# Patient Record
Sex: FEMALE | Race: WHITE | NOT HISPANIC OR LATINO | Employment: UNEMPLOYED | URBAN - METROPOLITAN AREA
[De-identification: names, ages, dates, MRNs, and addresses within clinical notes are randomized per-mention and may not be internally consistent; named-entity substitution may affect disease eponyms.]

---

## 2018-10-08 ENCOUNTER — TRANSCRIBE ORDERS (OUTPATIENT)
Dept: WOMENS IMAGING | Facility: HOSPITAL | Age: 28
End: 2018-10-08

## 2018-10-08 DIAGNOSIS — Z36.89 SCREENING, ANTENATAL, FOR FETAL ANATOMIC SURVEY: Primary | ICD-10-CM

## 2018-10-24 ENCOUNTER — HOSPITAL ENCOUNTER (OUTPATIENT)
Dept: WOMENS IMAGING | Facility: HOSPITAL | Age: 28
Discharge: HOME | End: 2018-10-24
Attending: OBSTETRICS & GYNECOLOGY | Admitting: OBSTETRICS & GYNECOLOGY

## 2018-10-24 ENCOUNTER — OFFICE VISIT (OUTPATIENT)
Dept: OBSTETRICS AND GYNECOLOGY | Facility: HOSPITAL | Age: 28
End: 2018-10-24

## 2018-10-24 VITALS
SYSTOLIC BLOOD PRESSURE: 126 MMHG | DIASTOLIC BLOOD PRESSURE: 68 MMHG | BODY MASS INDEX: 21.26 KG/M2 | WEIGHT: 127.6 LBS | HEIGHT: 65 IN

## 2018-10-24 DIAGNOSIS — Z36.89 SCREENING, ANTENATAL, FOR FETAL ANATOMIC SURVEY: ICD-10-CM

## 2018-10-24 DIAGNOSIS — Z98.891 PREVIOUS CESAREAN SECTION: Primary | ICD-10-CM

## 2018-10-24 DIAGNOSIS — O09.212 PREVIOUS PRETERM DELIVERY IN SECOND TRIMESTER, ANTEPARTUM: ICD-10-CM

## 2018-10-24 PROCEDURE — 99241 PR OFFICE CONSULTATION NEW/ESTAB PATIENT 15 MIN: CPT | Performed by: OBSTETRICS & GYNECOLOGY

## 2018-10-24 PROCEDURE — 76811 OB US DETAILED SNGL FETUS: CPT

## 2018-10-24 PROCEDURE — 76811 OB US DETAILED SNGL FETUS: CPT | Performed by: OBSTETRICS & GYNECOLOGY

## 2018-10-24 RX ORDER — ASCORBIC ACID 500 MG
500 TABLET ORAL DAILY
COMMUNITY

## 2018-10-24 NOTE — PROGRESS NOTES
Documentation of the ultasound findings, images, and interpretations as well as consultation note will be available in the patient's Viewpoint report located in the Chart Review Imaging tab in [employer].

## 2018-11-19 ENCOUNTER — TRANSCRIBE ORDERS (OUTPATIENT)
Dept: OBSTETRICS AND GYNECOLOGY | Facility: HOSPITAL | Age: 28
End: 2018-11-19

## 2018-11-19 DIAGNOSIS — O09.893 HX OF PRETERM DELIVERY, CURRENTLY PREGNANT, THIRD TRIMESTER: Primary | ICD-10-CM

## 2018-11-19 DIAGNOSIS — O26.879 SHORT CERVICAL LENGTH DURING PREGNANCY, UNSPECIFIED TRIMESTER: ICD-10-CM

## 2018-11-29 ENCOUNTER — OFFICE VISIT (OUTPATIENT)
Dept: OBSTETRICS AND GYNECOLOGY | Facility: HOSPITAL | Age: 28
End: 2018-11-29

## 2018-11-29 ENCOUNTER — HOSPITAL ENCOUNTER (OUTPATIENT)
Dept: WOMENS IMAGING | Facility: HOSPITAL | Age: 28
Discharge: HOME | End: 2018-11-29
Attending: OBSTETRICS & GYNECOLOGY | Admitting: OBSTETRICS & GYNECOLOGY

## 2018-11-29 VITALS — DIASTOLIC BLOOD PRESSURE: 69 MMHG | SYSTOLIC BLOOD PRESSURE: 112 MMHG | BODY MASS INDEX: 22.7 KG/M2 | WEIGHT: 136.4 LBS

## 2018-11-29 DIAGNOSIS — O09.212 PREVIOUS PRETERM DELIVERY IN SECOND TRIMESTER, ANTEPARTUM: ICD-10-CM

## 2018-11-29 DIAGNOSIS — O26.879 SHORT CERVICAL LENGTH DURING PREGNANCY, UNSPECIFIED TRIMESTER: ICD-10-CM

## 2018-11-29 DIAGNOSIS — O09.893 HX OF PRETERM DELIVERY, CURRENTLY PREGNANT, THIRD TRIMESTER: ICD-10-CM

## 2018-11-29 DIAGNOSIS — Z98.891 PREVIOUS CESAREAN SECTION: Primary | ICD-10-CM

## 2018-11-29 PROCEDURE — 76816 OB US FOLLOW-UP PER FETUS: CPT

## 2018-11-29 PROCEDURE — 76816 OB US FOLLOW-UP PER FETUS: CPT | Performed by: OBSTETRICS & GYNECOLOGY

## 2018-11-29 NOTE — PROGRESS NOTES
Documentation of the ultasound findings, images, and interpretations with be available in the patient's Viewpoint report located in the Chart Review Imaging tab in [employer].     Elaine Durant (NP; RN), NP in Family Health  [address] [address]  Phone: [phone]  Fax: [phone]

## 2018-11-29 NOTE — PROGRESS NOTES
Reports abdominal tightness and scant brown discharge yesterday; none since. Denies other problems. To see Dr. Juárez today.

## 2019-01-02 ENCOUNTER — HOSPITAL ENCOUNTER (INPATIENT)
Facility: HOSPITAL | Age: 29
LOS: 4 days | Discharge: HOME | End: 2019-01-06
Attending: OBSTETRICS & GYNECOLOGY | Admitting: OBSTETRICS & GYNECOLOGY

## 2019-01-02 PROBLEM — O60.00 PRETERM LABOR: Status: ACTIVE | Noted: 2019-01-02

## 2019-01-02 LAB
ABO GROUP BLD: NORMAL
BLD GP AB SCN SERPL QL: NEGATIVE
DEPRECATED RDW RBC AUTO: 48.5 FL (ref 37–54)
ERYTHROCYTE [DISTWIDTH] IN BLOOD BY AUTOMATED COUNT: 13.4 % (ref 11.3–14.5)
HCT VFR BLD AUTO: 35.4 % (ref 34.5–44)
HGB BLD-MCNC: 11.6 G/DL (ref 11.5–15.5)
MCH RBC QN AUTO: 32.5 PG (ref 27–31)
MCHC RBC AUTO-ENTMCNC: 32.8 G/DL (ref 32–36)
MCV RBC AUTO: 99.2 FL (ref 80–99)
PLATELET # BLD AUTO: 226 10*3/MM3 (ref 150–450)
PMV BLD AUTO: 9.5 FL (ref 6–12)
RBC # BLD AUTO: 3.57 10*6/MM3 (ref 3.89–5.14)
RH BLD: POSITIVE
T&S EXPIRATION DATE: NORMAL
WBC NRBC COR # BLD: 11.62 10*3/MM3 (ref 3.5–10.8)

## 2019-01-02 PROCEDURE — 25010000002 BETAMETHASONE ACET & SOD PHOS PER 4 MG: Performed by: OBSTETRICS & GYNECOLOGY

## 2019-01-02 PROCEDURE — 86900 BLOOD TYPING SEROLOGIC ABO: CPT | Performed by: OBSTETRICS & GYNECOLOGY

## 2019-01-02 PROCEDURE — 86850 RBC ANTIBODY SCREEN: CPT | Performed by: OBSTETRICS & GYNECOLOGY

## 2019-01-02 PROCEDURE — 85027 COMPLETE CBC AUTOMATED: CPT | Performed by: OBSTETRICS & GYNECOLOGY

## 2019-01-02 PROCEDURE — 59025 FETAL NON-STRESS TEST: CPT

## 2019-01-02 PROCEDURE — 25010000002 MAGNESIUM SULFATE-LACT RINGERS 40 GM/580ML SOLUTION: Performed by: OBSTETRICS & GYNECOLOGY

## 2019-01-02 PROCEDURE — 25010000002 PROMETHAZINE PER 50 MG: Performed by: OBSTETRICS & GYNECOLOGY

## 2019-01-02 PROCEDURE — 86901 BLOOD TYPING SEROLOGIC RH(D): CPT | Performed by: OBSTETRICS & GYNECOLOGY

## 2019-01-02 RX ORDER — PROMETHAZINE HYDROCHLORIDE 25 MG/ML
12.5 INJECTION, SOLUTION INTRAMUSCULAR; INTRAVENOUS EVERY 6 HOURS PRN
Status: DISCONTINUED | OUTPATIENT
Start: 2019-01-02 | End: 2019-01-06 | Stop reason: HOSPADM

## 2019-01-02 RX ORDER — MAGNESIUM SULF/RINGERS LACTATE 40 G/500ML
2 PLASTIC BAG, INJECTION (ML) INTRAVENOUS CONTINUOUS
Status: DISCONTINUED | OUTPATIENT
Start: 2019-01-02 | End: 2019-01-04

## 2019-01-02 RX ORDER — PROMETHAZINE HYDROCHLORIDE 12.5 MG/1
12.5 TABLET ORAL EVERY 6 HOURS PRN
Status: DISCONTINUED | OUTPATIENT
Start: 2019-01-02 | End: 2019-01-06 | Stop reason: HOSPADM

## 2019-01-02 RX ORDER — PRENATAL VIT/IRON FUM/FOLIC AC 27MG-0.8MG
1 TABLET ORAL NIGHTLY
Status: DISCONTINUED | OUTPATIENT
Start: 2019-01-02 | End: 2019-01-06 | Stop reason: HOSPADM

## 2019-01-02 RX ORDER — ACETAMINOPHEN 325 MG/1
650 TABLET ORAL EVERY 4 HOURS PRN
Status: DISCONTINUED | OUTPATIENT
Start: 2019-01-02 | End: 2019-01-06 | Stop reason: HOSPADM

## 2019-01-02 RX ORDER — PRENATAL VIT/IRON FUM/FOLIC AC 27MG-0.8MG
1 TABLET ORAL DAILY
Status: DISCONTINUED | OUTPATIENT
Start: 2019-01-02 | End: 2019-01-02

## 2019-01-02 RX ORDER — BETAMETHASONE SODIUM PHOSPHATE AND BETAMETHASONE ACETATE 3; 3 MG/ML; MG/ML
12 INJECTION, SUSPENSION INTRA-ARTICULAR; INTRALESIONAL; INTRAMUSCULAR; SOFT TISSUE EVERY 24 HOURS
Status: COMPLETED | OUTPATIENT
Start: 2019-01-02 | End: 2019-01-03

## 2019-01-02 RX ORDER — SODIUM CHLORIDE 0.9 % (FLUSH) 0.9 %
3 SYRINGE (ML) INJECTION EVERY 12 HOURS SCHEDULED
Status: DISCONTINUED | OUTPATIENT
Start: 2019-01-02 | End: 2019-01-06 | Stop reason: HOSPADM

## 2019-01-02 RX ORDER — LIDOCAINE HYDROCHLORIDE 10 MG/ML
5 INJECTION, SOLUTION EPIDURAL; INFILTRATION; INTRACAUDAL; PERINEURAL AS NEEDED
Status: DISCONTINUED | OUTPATIENT
Start: 2019-01-02 | End: 2019-01-06 | Stop reason: HOSPADM

## 2019-01-02 RX ORDER — SODIUM CHLORIDE 0.9 % (FLUSH) 0.9 %
3-10 SYRINGE (ML) INJECTION AS NEEDED
Status: DISCONTINUED | OUTPATIENT
Start: 2019-01-02 | End: 2019-01-06 | Stop reason: HOSPADM

## 2019-01-02 RX ORDER — SODIUM CHLORIDE, SODIUM LACTATE, POTASSIUM CHLORIDE, CALCIUM CHLORIDE 600; 310; 30; 20 MG/100ML; MG/100ML; MG/100ML; MG/100ML
96 INJECTION, SOLUTION INTRAVENOUS CONTINUOUS
Status: DISCONTINUED | OUTPATIENT
Start: 2019-01-02 | End: 2019-01-06 | Stop reason: HOSPADM

## 2019-01-02 RX ORDER — BISACODYL 5 MG/1
5 TABLET, DELAYED RELEASE ORAL DAILY PRN
Status: DISCONTINUED | OUTPATIENT
Start: 2019-01-02 | End: 2019-01-06 | Stop reason: HOSPADM

## 2019-01-02 RX ADMIN — BETAMETHASONE ACETATE AND BETAMETHASONE SODIUM PHOSPHATE 12 MG: 3; 3 INJECTION, SUSPENSION INTRA-ARTICULAR; INTRALESIONAL; INTRAMUSCULAR; SOFT TISSUE at 13:38

## 2019-01-02 RX ADMIN — SODIUM CHLORIDE, POTASSIUM CHLORIDE, SODIUM LACTATE AND CALCIUM CHLORIDE 96 ML/HR: 600; 310; 30; 20 INJECTION, SOLUTION INTRAVENOUS at 20:04

## 2019-01-02 RX ADMIN — Medication 2 G/HR: at 14:03

## 2019-01-02 RX ADMIN — PROMETHAZINE HYDROCHLORIDE 12.5 MG: 25 INJECTION INTRAMUSCULAR; INTRAVENOUS at 13:37

## 2019-01-02 RX ADMIN — SODIUM CHLORIDE, POTASSIUM CHLORIDE, SODIUM LACTATE AND CALCIUM CHLORIDE 96 ML/HR: 600; 310; 30; 20 INJECTION, SOLUTION INTRAVENOUS at 13:36

## 2019-01-02 NOTE — PLAN OF CARE
Problem: Patient Care Overview  Goal: Plan of Care Review  Outcome: Ongoing (interventions implemented as appropriate)   19 1700   Coping/Psychosocial   Plan of Care Reviewed With patient     Goal: Individualization and Mutuality  Outcome: Ongoing (interventions implemented as appropriate)    Goal: Discharge Needs Assessment  Outcome: Ongoing (interventions implemented as appropriate)    Goal: Interprofessional Rounds/Family Conf  Outcome: Ongoing (interventions implemented as appropriate)      Problem:  Labor (Adult,Obstetrics,Pediatric)  Goal: Signs and Symptoms of Listed Potential Problems Will be Absent, Minimized or Managed ( Labor)  Outcome: Ongoing (interventions implemented as appropriate)   19 1802   Goal/Outcome Evaluation   Problems Assessed ( Labor) all   Problems Present ( Labor) none

## 2019-01-02 NOTE — H&P
History and Physical:    Subjective     No chief complaint on file.      Luz Elena Chakraborty is a 28 y.o. year old  with an Estimated Date of Delivery: 3/19/19 currently at 29w1d presenting with Increasing contractions and pelvic discomfort for past two days. Cvx in office noted to be dilated to 1/50/-2 and soft. Patient has a history of PPROM and PTL in past and declined Iatan. .    Prenatal care has been with Ave Juárez MD.  It has been significant for  labor and previous C/S - (classical at 27 weeks).        Review of Systems  Pertinent items are noted in HPI.     Past Medical History:   Diagnosis Date   • Abnormal Pap smear of cervix    • H/O  section    • HPV in female      Past Surgical History:   Procedure Laterality Date   •  SECTION  02/15/2016   • SHOULDER SURGERY      Right shoulder, posterior capsule release     No family history on file.  Social History     Tobacco Use   • Smoking status: Never Smoker   • Smokeless tobacco: Never Used   Substance Use Topics   • Alcohol use: No     Frequency: Never   • Drug use: No     Medications Prior to Admission   Medication Sig Dispense Refill Last Dose   • ELDERBERRY PO Take 2-3 teaspoon(s) by mouth Daily.   Taking   • Ferrous Sulfate (SLOW FE PO) Take 45 mg by mouth Daily.   Taking   • Prenatal Vit-Fe Fumarate-FA (PRENATAL VITAMIN PO) Take 1 tablet by mouth Daily.   Taking   • vitamin C (ASCORBIC ACID) 500 MG tablet Take 500 mg by mouth Daily.   Taking   • VITAMIN D-VITAMIN K PO Take 1 tablet by mouth Daily.   Taking     Allergies:  Patient has no known allergies.  OB History    Para Term  AB Living   4 2 0 2 1 2   SAB TAB Ectopic Molar Multiple Live Births   1 0 0 0 0 2      # Outcome Date GA Lbr Duane/2nd Weight Sex Delivery Anes PTL Lv   4 Current            3 SAB 2017 5w0d             Birth Comments: no D&C   2  02/15/16 27w2d  1247 g (2 lb 12 oz) F CS-Unspec  Y GLENN      Complications:   premature rupture of membranes      Birth Comments: PPROM @ 24 weeks   1  14 36w4d  2722 g (6 lb) F Vag-Spont  N GLENN      Birth Comments: Spontaneous labor            Objective     LMP 2018     Physical Exam    General:  No acute distress           Abdomen: Gravid, nontender       FHT's: reassuring    Cervix: 1/50/-2   [place]: Contraction are irregular     Lab Review   External Prenatal Results     Pregnancy Outside Results - Transcribed From Office Records - See Scanned Records For Details     Test Value Date Time    Hgb 12.4 g/dL 10/08/15 1606    Hct 36.5 % 10/08/15 1606    ABO       Rh       Antibody Screen       Glucose Fasting GTT       Glucose Tolerance Test 1 hour       Glucose Tolerance Test 3 hour       Gonorrhea (discrete)       Chlamydia (discrete)       RPR       VDRL       Syphilis Antibody       Rubella       HBsAg NonReactive  10/08/15 1606    Herpes Simplex Virus PCR       Herpes Simplex VIrus Culture       HIV       Hep C RNA Quant PCR       Hep C Antibody       AFP       Group B Strep       GBS Susceptibility to Clindamycin       GBS Susceptibility to Erythromycin       Fetal Fibronectin       Genetic Testing, Maternal Blood             Drug Screening     Test Value Date Time    Urine Drug Screen       Amphetamine Screen Negative ng/mL 10/08/15 1606    Barbiturate Screen Negative ng/mL 10/08/15 1606    Benzodiazepine Screen Negative ng/mL 10/08/15 1606    Methadone Screen Negative ng/mL 10/08/15 1606    Phencyclidine Screen Negative ng/mL 10/08/15 1606    Opiates Screen       THC Screen       Cocaine Screen       Propoxyphene Screen Negative ng/mL 10/08/15 1606    Buprenorphine Screen Negative ng/mL 10/08/15 1606    Methamphetamine Screen       Oxycodone Screen Negative ng/mL 10/08/15 1606    Tricyclic Antidepressants Screen                       Assessment/Plan     ASSESSMENT  1. IUP at 29w1d  2.  labor- with h/o c/s at 27 weeks.     PLAN  1. Admit to labor and  delivery   2.  Mag through sterpoid time. PDC to consult  3. Patient for repeat c/s if labors         Ave Juárez MD  1/2/2019@

## 2019-01-03 ENCOUNTER — APPOINTMENT (OUTPATIENT)
Dept: WOMENS IMAGING | Facility: HOSPITAL | Age: 29
End: 2019-01-03

## 2019-01-03 PROCEDURE — 25010000002 MAGNESIUM SULFATE-LACT RINGERS 40 GM/580ML SOLUTION: Performed by: OBSTETRICS & GYNECOLOGY

## 2019-01-03 PROCEDURE — 59025 FETAL NON-STRESS TEST: CPT

## 2019-01-03 PROCEDURE — 76816 OB US FOLLOW-UP PER FETUS: CPT | Performed by: OBSTETRICS & GYNECOLOGY

## 2019-01-03 PROCEDURE — 25010000002 BETAMETHASONE ACET & SOD PHOS PER 4 MG: Performed by: OBSTETRICS & GYNECOLOGY

## 2019-01-03 PROCEDURE — 76816 OB US FOLLOW-UP PER FETUS: CPT

## 2019-01-03 RX ADMIN — SODIUM CHLORIDE, POTASSIUM CHLORIDE, SODIUM LACTATE AND CALCIUM CHLORIDE 96 ML/HR: 600; 310; 30; 20 INJECTION, SOLUTION INTRAVENOUS at 06:34

## 2019-01-03 RX ADMIN — Medication 2 G/HR: at 07:21

## 2019-01-03 RX ADMIN — BETAMETHASONE ACETATE AND BETAMETHASONE SODIUM PHOSPHATE 12 MG: 3; 3 INJECTION, SUSPENSION INTRA-ARTICULAR; INTRALESIONAL; INTRAMUSCULAR; SOFT TISSUE at 14:10

## 2019-01-03 NOTE — PAYOR COMM NOTE
"Tiago Bauer (28 y.o. Female)   [place] Medicaid ID#[id number]  Inpatient Medical Admission 1/2/19    From: Connie Hare  #[phone]  Fax#[phone]      Date of Birth Social Security Number Address Home Phone MRN    1990  [address]   [place] [address] [phone] [id number]    [employer] Mar[employer] Status          Buddhist        Admission Date Admission Type Admitting Provider Attending Provider Department, Room/Bed    1/2/19 Elective Ave Juárez MD Ashmun, Julie N, MD [facility] LABOR DELIVERY, N303/1    Discharge Date Discharge Disposition Discharge Destination                       Attending Provider:  Ave Juárez MD    Allergies:  No Known Allergies    Isolation:  None   Infection:  None   Code Status:  CPR    Ht:  165.1 cm (65\")   Wt:  65.8 kg (145 lb)    Admission Cmt:  None   Principal Problem:  None                Active Insurance as of 1/2/2019     Primary Coverage     Payor Plan Insurance Group Employer/Plan Group    [facility] MEDICAID [facility] MEDICAID [id number]     Payor Plan Address Payor Plan Phone Number Payor Plan Fax Number Effective Dates    [address] [address] [phone]  9/1/2018 - None Entered    [facility] [id number]       Subscriber Name Subscriber Birth Date Member ID       TIAGO BAUER 1990 [id number]                 Emergency Contacts      (Rel.) Home Phone Work Phone Mobile Phone    Gustavo Bauer (Spouse) [phone] -- --            Insurance Information                [facility] MEDICAID/ANTHEM MEDICAID Phone: [phone]    Subscriber: Tiago Bauer Subscriber#: [id number]    Group#: [id number] Precert#:           Treatment Team     Provider Relationship Specialty Contact    Ave Juárez MD Attending, Surgeon Obstetrics and Gynecology  [phone]    Ambreen Mackay, RD Dietitian Nutrition  [phone]    Araceli Hodgson, RN Registered Nurse Obstetrics            Problem List           Codes " Noted - Resolved       Hospital     labor ICD-10-CM: O60.00  ICD-9-CM: 644.00 2019 - Present       Non-Hospital    Previous  section ICD-10-CM: Z98.891  ICD-9-CM: V45.89 10/24/2018 - Present    Previous  delivery in second trimester, antepartum ICD-10-CM: O09.212  ICD-9-CM: V23.41 10/24/2018 - Present             History & Physical      H&P filed by [employer], [place] at 2018  2:18 PM     Scan on 2019: < 39 WEEKS FORM  TC 2018 (below)            Electronically signed by Interface, Scans Incoming at 2018  2:18 PM     Ave Juárez MD at 2019  1:04 PM          History and Physical:    Subjective     No chief complaint on file.      Luz Elena Chakraborty is a 28 y.o. year old  with an Estimated Date of Delivery: 3/19/19 currently at 29w1d presenting with Increasing contractions and pelvic discomfort for past two days. Cvx in office noted to be dilated to 1/50/-2 and soft. Patient has a history of PPROM and PTL in past and declined Tatiana. .    Prenatal care has been with Ave Juárez MD.  It has been significant for  labor and previous C/S - (classical at 27 weeks).        Review of Systems  Pertinent items are noted in HPI.     Past Medical History:   Diagnosis Date   • Abnormal Pap smear of cervix    • H/O  section    • HPV in female      Past Surgical History:   Procedure Laterality Date   •  SECTION  02/15/2016   • SHOULDER SURGERY  2010    Right shoulder, posterior capsule release     No family history on file.  Social History     Tobacco Use   • Smoking status: Never Smoker   • Smokeless tobacco: Never Used   Substance Use Topics   • Alcohol use: No     Frequency: Never   • Drug use: No     Medications Prior to Admission   Medication Sig Dispense Refill Last Dose   • ELDERBERRY PO Take 2-3 teaspoon(s) by mouth Daily.   Taking   • Ferrous Sulfate (SLOW FE PO) Take 45 mg by mouth Daily.   Taking   • Prenatal Vit-Fe Fumarate-FA  (PRENATAL VITAMIN PO) Take 1 tablet by mouth Daily.   Taking   • vitamin C (ASCORBIC ACID) 500 MG tablet Take 500 mg by mouth Daily.   Taking   • VITAMIN D-VITAMIN K PO Take 1 tablet by mouth Daily.   Taking     Allergies:  Patient has no known allergies.  OB History    Para Term  AB Living   4 2 0 2 1 2   SAB TAB Ectopic Molar Multiple Live Births   1 0 0 0 0 2      # Outcome Date GA Lbr Duane/2nd Weight Sex Delivery Anes PTL Lv   4 Current            3 SAB 2017 5w0d             Birth Comments: no D&C   2  02/15/16 27w2d  1247 g (2 lb 12 oz) F CS-Unspec  Y GLENN      Complications:  premature rupture of membranes      Birth Comments: PPROM @ 24 weeks   1  14 36w4d  2722 g (6 lb) F Vag-Spont  N GLENN      Birth Comments: Spontaneous labor            Objective     LMP 2018     Physical Exam    General:  No acute distress           Abdomen: Gravid, nontender       FHT's: reassuring    Cervix: 1/50/-2   [place]: Contraction are irregular     Lab Review   External Prenatal Results     Pregnancy Outside Results - Transcribed From Office Records - See Scanned Records For Details     Test Value Date Time    Hgb 12.4 g/dL 10/08/15 1606    Hct 36.5 % 10/08/15 1606    ABO       Rh       Antibody Screen       Glucose Fasting GTT       Glucose Tolerance Test 1 hour       Glucose Tolerance Test 3 hour       Gonorrhea (discrete)       Chlamydia (discrete)       RPR       VDRL       Syphilis Antibody       Rubella       HBsAg NonReactive  10/08/15 1606    Herpes Simplex Virus PCR       Herpes Simplex VIrus Culture       HIV       Hep C RNA Quant PCR       Hep C Antibody       AFP       Group B Strep       GBS Susceptibility to Clindamycin       GBS Susceptibility to Erythromycin       Fetal Fibronectin       Genetic Testing, Maternal Blood             Drug Screening     Test Value Date Time    Urine Drug Screen       Amphetamine Screen Negative ng/mL 10/08/15 1606    Barbiturate Screen  Negative ng/mL 10/08/15 1606    Benzodiazepine Screen Negative ng/mL 10/08/15 1606    Methadone Screen Negative ng/mL 10/08/15 1606    Phencyclidine Screen Negative ng/mL 10/08/15 1606    Opiates Screen       THC Screen       Cocaine Screen       Propoxyphene Screen Negative ng/mL 10/08/15 1606    Buprenorphine Screen Negative ng/mL 10/08/15 1606    Methamphetamine Screen       Oxycodone Screen Negative ng/mL 10/08/15 1606    Tricyclic Antidepressants Screen                       Assessment/Plan     ASSESSMENT  1. IUP at 29w1d  2.  labor- with h/o c/s at 27 weeks.     PLAN  1. Admit to labor and delivery   2.  Mag through sterpoid time. PDC to consult  3. Patient for repeat c/s if labors         Ave Juárez MD  2019@    Electronically signed by Ave Juárez MD at 2019  1:22 PM       ICU Vital Signs     Row Name 19 0722 19 0700 19 0657 19 0600 19 0557       Vitals    Temp  97.8 °F (36.6 °C)  --  --  --  --    Temp src  Oral  --  --  --  --    Pulse  --  --  95  --  94    Resp  --  18  --  18  --    Resp Rate Source  --  Visual  --  Visual  --    BP  --  --  97/54  --  94/51    Row Name 19 0500 19 0457 19 0400 19 0356 19 0300       Vitals    Pulse  --  89  --  89  --    Resp  18  --  18  --  18    Resp Rate Source  Visual  --  Visual  --  Visual    BP  --  94/55  --  91/53  --    Row Name 19 0256 19 0200 19 0156 19 0100 19 0056       Vitals    Pulse  95  --  98  --  104    Resp  --  18  --  18  --    Resp Rate Source  --  Visual  --  Visual  --    BP  91/52  --  99/57  --  92/54    Row Name 19 0000 19 2356 01/0219       Vitals    Pulse  --  93  --  --  97    Resp  18  --  --  18  --    Resp Rate Source  Visual  --  --  Visual  --    BP  --    --  --         Patient Observation    Observations  --  --  up to the bathroom with RN assist, gait steady,l  "denies needs at this time   --  --    Row Name 01/02/19 2200 01/02/19 2156 01/02/19 2100 01/02/19 2057 01/02/19 2005       Vitals    Pulse  --  104  --  108  --    Resp  18  --  18  --  --    Resp Rate Source  Visual  --  Visual  --  --    BP  --  98/54  --  96/50  --       Patient Observation    Observations  --  --  --  audible fetal movement, pt states the baby is very active at this time, denies needs at this time   pt sitting up on side of bed brushing teeth, denies further needs at this time     Row Name 01/02/19 2000 01/02/19 1956 01/02/19 1856 01/02/19 1813 01/02/19 1656       Vitals    Pulse  --  99  100  108  113    Resp  18  --  16  16  16    Resp Rate Source  Visual  --  Visual  Visual  Visual    BP  --  89/51  (Abnormal)   90/54  109/59  110/65       Patient Observation    Observations  pt up to the bathroom with RN assist, gait steady  --  --  --  --    Row Name 01/02/19 1605 01/02/19 1556 01/02/19 1456 01/02/19 1356 01/02/19 1342       Height and Weight    Height  --  --  --  165.1 cm (65\")  --    Height Method  --  --  --  Stated  --    Weight  --  --  --  65.8 kg (145 lb)  --    Weight Method  --  --  --  Stated  --    Ideal Body Weight (IBW) (kg)  --  --  --  57.29  --    BSA (Calculated - sq m)  --  --  --  1.73 sq meters  --    BMI (Calculated)  --  --  --  24.1  --    Weight in (lb) to have BMI = 25  --  --  --  149.9  --       Vitals    Temp  --  --  --  98.8 °F (37.1 °C)  98.9 °F (37.2 °C)    Temp src  --  --  --  Oral  Oral    Pulse  100  100  94  113  115    Heart Rate Source  --  --  --  Monitor  Monitor    Resp  16  --  16  16  16    Resp Rate Source  Visual  --  Visual  Visual  Visual    BP  103/57  88/53  (Abnormal)   91/52  111/64  124/76    Row Name 01/02/19 1303                   Vitals    Temp  97.8 °F (36.6 °C)        Temp src  Oral        Pulse  106        Heart Rate Source  Monitor        Resp  16        Resp Rate Source  Visual        BP  125/74            Hospital Medications " (active)       Dose Frequency Start End    acetaminophen (TYLENOL) tablet 650 mg 650 mg Every 4 Hours PRN 1/2/2019     Sig - Route: Take 2 tablets by mouth Every 4 (Four) Hours As Needed for Mild Pain  or Headache. - Oral    betamethasone acetate-betamethasone sodium phosphate (CELESTONE SOLUSPAN) injection 12 mg 12 mg Every 24 Hours 1/2/2019 1/4/2019    Sig - Route: Inject 2 mL into the appropriate muscle as directed by prescriber Daily. - Intramuscular    bisacodyl (DULCOLAX) EC tablet 5 mg 5 mg Daily PRN 1/2/2019     Sig - Route: Take 1 tablet by mouth Daily As Needed for Constipation. - Oral    lactated ringers infusion 96 mL/hr Continuous 1/2/2019     Sig - Route: Infuse 96 mL/hr into a venous catheter Continuous. - Intravenous    lidocaine PF 1% (XYLOCAINE) injection 5 mL 5 mL As Needed 1/2/2019     Sig - Route: Inject 5 mL into the appropriate area of the skin as directed by provider As Needed (IV starts). - Intradermal    magnesium sulfate bolus from bag 0.07 g/mL solution 4 g 4 g Once 1/2/2019 1/2/2019    Sig - Route: Infuse 57.14 mL into a venous catheter 1 (One) Time. - Intravenous    Magnesium Sulfate-Lact Ringers 40 GM/580ML 2 g/hr Continuous 1/2/2019 1/5/2019    Sig - Route: Infuse 2 g/hr into a venous catheter Continuous. - Intravenous    prenatal vitamin 27-0.8 tablet 1 tablet 1 tablet Nightly 1/2/2019     Sig - Route: Take 1 tablet by mouth Every Night. - Oral    promethazine (PHENERGAN) injection 12.5 mg 12.5 mg Every 6 Hours PRN 1/2/2019     Sig - Route: Infuse 0.5 mL into a venous catheter Every 6 (Six) Hours As Needed for Nausea or Vomiting. - Intravenous    promethazine (PHENERGAN) tablet 12.5 mg 12.5 mg Every 6 Hours PRN 1/2/2019     Sig - Route: Take 1 tablet by mouth Every 6 (Six) Hours As Needed for Nausea or Vomiting. - Oral    sodium chloride 0.9 % flush 3 mL 3 mL Every 12 Hours Scheduled 1/2/2019     Sig - Route: Infuse 3 mL into a venous catheter Every 12 (Twelve) Hours. - Intravenous     sodium chloride 0.9 % flush 3-10 mL 3-10 mL As Needed 2019     Sig - Route: Infuse 3-10 mL into a venous catheter As Needed for Line Care. - Intravenous    prenatal vitamin 27-0.8 tablet 1 tablet (Discontinued) 1 tablet Daily 2019    Sig - Route: Take 1 tablet by mouth Daily. - Oral            Lab Results (last 24 hours)     Procedure Component Value Units Date/Time    CBC (No Diff) [[id number]]  (Abnormal) Collected:  19 1338    Specimen:  Blood Updated:  19 1359     WBC 11.62 10*3/mm3      RBC 3.57 10*6/mm3      Hemoglobin 11.6 g/dL      Hematocrit 35.4 %      MCV 99.2 fL      MCH 32.5 pg      MCHC 32.8 g/dL      RDW 13.4 %      RDW-SD 48.5 fl      MPV 9.5 fL      Platelets 226 10*3/mm3         Imaging Results (last 24 hours)     ** No results found for the last 24 hours. **        Orders (last 24 hrs)     Start     Ordered    19 0000  [facility]  1 Time Imaging      19 1303    19 2100  prenatal vitamin 27-0.8 tablet 1 tablet  Nightly      19 1836    19 1430  lactated ringers infusion  Continuous      19 1335    19 1400  sodium chloride 0.9 % flush 3 mL  Every 12 Hours Scheduled      19 1303    19 1400  magnesium sulfate bolus from bag 0.07 g/mL solution 4 g  Once      19 1303    19 1400  Magnesium Sulfate-Lact Ringers 40 GM/580ML  Continuous      19 1303    19 1400  betamethasone acetate-betamethasone sodium phosphate (CELESTONE SOLUSPAN) injection 12 mg  Every 24 Hours      19 1303    19 1400  prenatal vitamin 27-0.8 tablet 1 tablet  Daily,   Status:  Discontinued      19 1303    19 1337  promethazine (PHENERGAN) injection 12.5 mg  Every 6 Hours PRN      19 1337    19 1302  bisacodyl (DULCOLAX) EC tablet 5 mg  Daily PRN      19 1303    19 1301  promethazine (PHENERGAN) tablet 12.5 mg  Every 6 Hours PRN      19 1303    19  1301  acetaminophen (TYLENOL) tablet 650 mg  Every 4 Hours PRN      19 1303    19 1301  Inpatient Maternal & Fetal Medicine Consult  Once     Specialty:  Maternal and Fetal Medicine  Provider:  (Not yet assigned)    19 1303    19 1300  Place Sequential Compression Device  Once      19 1303    19 1300  Maintain Sequential Compression Device  Continuous      19 1303    19 1300  Diet Regular  Diet Effective Now      19 1303    19 1259  Admit To Obstetrics Inpatient  Once      19 1303    19 1259  Code Status and Medical Interventions:  Continuous      19 1303    19 1259  Vital Signs Per Hospital Policy  Per Hospital Policy      19 1303    19 1259  Initiate Group Beta Strep (GBS) Prophylaxis Protocol, If Criteria Met  Continuous     Comments:  NO TREATMENT RECOMMENDED IF: 1) Maternal GBS Status Known Negative 2) Scheduled  Birth With Intact Membranes, Not in Labor 3) Maternal GBS Status Unknown, No Risk Factors  TREAT WITH ANTIBIOTICS IF:  1) Maternal GBS Status Known Positive 2) Maternal GBS Status Unknown With Risk Factors: a)  Previous Infant Affected By GBS Infection b) GBS Urinary Tract Infection (UTI) or Bacteriuria During Pregnancy c) Unexplained Maternal Fever (100.4F (38C) or Greater) During Labor d)  Prolonged Rupture of Membranes (18 or More Hours) e) Gestational Age Less Than 37 Weeks    19 1303    19 1259  Monitor Fetal Heart Tones Unless Patient Requests Intermittent  Until Discontinued      19 1303    19 1259  External Uterine Contraction Monitoring  Per Hospital Policy      19 1303    19 1259  Notify Provider (Specified)  Until Discontinued      19 1303    19 1259  Notify Provider of Tachysystole (Per Hospital Algorithm)  Until Discontinued      19 1303    19 1259  Notify Provider if Membranes Ruptured, Bleeding Greater Than 1 Pad Per Hour,  Fetal Heart Tone Abnormality or Severe Pain  Until Discontinued      01/02/19 1303    01/02/19 1259  CBC (No Diff)  Once      01/02/19 1303    01/02/19 1259  Type & Screen  Once      01/02/19 1303    01/02/19 1259  Insert Peripheral IV  Once      01/02/19 1303    01/02/19 1259  Saline Lock & Maintain IV Access  Continuous      01/02/19 1303    01/02/19 1258  lidocaine PF 1% (XYLOCAINE) injection 5 mL  As Needed      01/02/19 1303    01/02/19 1258  sodium chloride 0.9 % flush 3-10 mL  As Needed      01/02/19 1303    Unscheduled  Position Change - For Intra-Uterine Resusitation for Hypertonus, HyperStimulation or Non-Reassuring Fetal Status  As Needed      01/02/19 1303          Physician Progress Notes (last 24 hours) (Notes from 1/2/2019  7:44 AM through 1/3/2019  7:44 AM)     No notes of this type exist for this encounter.        Consult Notes (last 24 hours) (Notes from 1/2/2019  7:44 AM through 1/3/2019  7:44 AM)     No notes of this type exist for this encounter.

## 2019-01-03 NOTE — PROGRESS NOTES
Progress Note    Patient name: Luz Elena Chakraborty  YOB: 1990   MRN: [id number]  Admission Date: 2019  Date of Service: 1/3/2019    Luz Elena Chakraborty is a 28 y.o.    at 29w2d  admitted on 2019 for   Patient Active Problem List   Diagnosis   • Previous  section   • Previous  delivery in second trimester, antepartum   •  labor       Hospital day 1    Diagnoses:   Patient Active Problem List   Diagnosis   • Previous  section   • Previous  delivery in second trimester, antepartum   •  labor       Subjective:      Luz Elena has no complaints today.   Reports fetal movement is normal  Denies leakage of amniotic fluid.  Denies vaginal bleeding  irregular mainly when bladder is full    Objective:     Vital signs:  Temp:  [97.8 °F (36.6 °C)-98.9 °F (37.2 °C)] 97.8 °F (36.6 °C)  Heart Rate:  [] 95  Resp:  [16-18] 18  BP: ()/(50-76) 97/54    Abdomen: soft, nontender  Uterus: gravid, nontender  Extremities: nontender; no edema     FHT's: Category 2  TOCO: irregular    Cervix: last check      Medications:    betamethasone acetate-betamethasone sodium phosphate 12 mg Intramuscular Q24H   prenatal vitamin 27-0.8 1 tablet Oral Nightly   sodium chloride 3 mL Intravenous Q12H      •  acetaminophen  •  bisacodyl  •  lidocaine PF 1%  •  promethazine  •  promethazine  •  sodium chloride    Labs:  Lab Results   Component Value Date    HGB 11.6 2019     Lab Results   Component Value Date    GLUCOSE 72 10/08/2015       Assessment/Plan:      Luz Elena is a 28 y.o.    at 29w2d.  1.   Patient Active Problem List   Diagnosis   • Previous  section   • Previous  delivery in second trimester, antepartum   •  labor   :         Plan:   1. Mag through steroid time and then try to transition to procardia.       Ave Juárez MD  1/3/2019  8:22 AM

## 2019-01-04 PROCEDURE — 59025 FETAL NON-STRESS TEST: CPT

## 2019-01-04 PROCEDURE — 25010000002 MAGNESIUM SULFATE-LACT RINGERS 40 GM/580ML SOLUTION: Performed by: OBSTETRICS & GYNECOLOGY

## 2019-01-04 RX ORDER — NIFEDIPINE 10 MG/1
10 CAPSULE ORAL EVERY 6 HOURS SCHEDULED
Status: DISCONTINUED | OUTPATIENT
Start: 2019-01-05 | End: 2019-01-05

## 2019-01-04 RX ADMIN — PRENATAL VIT W/ FE FUMARATE-FA TAB 27-0.8 MG 1 TABLET: 27-0.8 TAB at 20:05

## 2019-01-04 RX ADMIN — NIFEDIPINE 10 MG: 10 CAPSULE, LIQUID FILLED ORAL at 23:32

## 2019-01-04 RX ADMIN — Medication 2 G/HR: at 08:30

## 2019-01-04 RX ADMIN — SODIUM CHLORIDE, POTASSIUM CHLORIDE, SODIUM LACTATE AND CALCIUM CHLORIDE 96 ML/HR: 600; 310; 30; 20 INJECTION, SOLUTION INTRAVENOUS at 14:39

## 2019-01-04 RX ADMIN — SODIUM CHLORIDE, POTASSIUM CHLORIDE, SODIUM LACTATE AND CALCIUM CHLORIDE 96 ML/HR: 600; 310; 30; 20 INJECTION, SOLUTION INTRAVENOUS at 03:20

## 2019-01-04 NOTE — PROGRESS NOTES
"Adult Nutrition  Assessment/PES    Patient Name:  Luz Elena Chakraborty  YOB: 1990  MRN: [id number]  Admit Date:  2019    Assessment Date:  2019    Comments:      Reason for Assessment     Row Name 19 1248          Reason for Assessment    Reason For Assessment  other (see comments) length of stay     Diagnosis  other (see comments) admitted at 29w1d with  labor           Anthropometrics     Row Name 19 1248          Anthropometrics    Height Method  stated     Height  165.1 cm (65\")        Admit Weight    Admit Weight Method  measured     Admit Weight  65.8 kg (145 lb 1 oz)        Ideal Body Weight (IBW)    Ideal Body Weight (IBW) (kg)  57.29        IBW Adjustment, Para/Tetraplegia    5% Adjustment, Para (IBW)  54.43     10% Adjustment, Para (IBW)  51.56     10% Adjustment, Tetra (IBW)  51.56     15% Adjustment, Tetra (IBW)  48.7         Labs/Tests/Procedures/Meds     Row Name 19 1249          Labs/Procedures/Meds    Lab Results Reviewed  reviewed           Estimated/Assessed Needs     Row Name 19 1248          Calculation Measurements    Height  165.1 cm (65\")         Nutrition Prescription Ordered     Row Name 19 1249          Nutrition Prescription PO    Current PO Diet  Regular         Evaluation of Received Nutrient/Fluid Intake     Row Name 19 1249 19 1248       Calculation Measurements    Height  --  165.1 cm (65\")       PO Evaluation    Number of Days PO Intake Evaluated  Insufficient Data  --        Evaluation of Prescribed Nutrient/Fluid Intake     Row Name 19 1248          Calculation Measurements    Height  165.1 cm (65\")             Problem/Interventions:  Problem 1     Row Name 19 1249          Nutrition Diagnoses Problem 1    Problem 1  No Nutrition Diagnosis at this Time                 Intervention Goal     Row Name 19 1249          Intervention Goal    General  Nutrition support treatment     Weight  Appropriate " weight gain         Nutrition Intervention     Row Name 01/04/19 1250          Nutrition Intervention    RD/Tech Action  Encourage intake;Follow Tx progress;Care plan reviewd           Education/Evaluation     Row Name 01/04/19 1250          Monitor/Evaluation    Monitor  Per protocol;PO intake;Pertinent labs;Weight           Electronically signed by:  Ambreen Mackay RD  01/04/19 12:50 PM   Time Spent:  25 minutes

## 2019-01-04 NOTE — PLAN OF CARE
Problem: Patient Care Overview  Goal: Plan of Care Review  Outcome: Ongoing (interventions implemented as appropriate)   19 0415 19 0639   OTHER   Outcome Summary --  VSS, patient doing well   Coping/Psychosocial   Plan of Care Reviewed With patient --      Goal: Individualization and Mutuality  Outcome: Ongoing (interventions implemented as appropriate)    Goal: Discharge Needs Assessment  Outcome: Ongoing (interventions implemented as appropriate)    Goal: Interprofessional Rounds/Family Conf  Outcome: Ongoing (interventions implemented as appropriate)      Problem:  Labor (Adult,Obstetrics,Pediatric)  Goal: Signs and Symptoms of Listed Potential Problems Will be Absent, Minimized or Managed ( Labor)  Outcome: Ongoing (interventions implemented as appropriate)

## 2019-01-04 NOTE — PROGRESS NOTES
Progress Note    Patient name: Luz Elena Chakraborty  YOB: 1990   MRN: [id number]  Admission Date: 2019  Date of Service: 2019    Luz Elena Chakraborty is a 28 y.o.    at 29w3d  admitted on 2019 for   Patient Active Problem List   Diagnosis   • Previous  section   • Previous  delivery in second trimester, antepartum   •  labor       Hospital day 2    Diagnoses:   Patient Active Problem List   Diagnosis   • Previous  section   • Previous  delivery in second trimester, antepartum   •  labor       Subjective:      Luz Elena has no complaints today.   Reports fetal movement is normal  Denies leakage of amniotic fluid.  Denies vaginal bleeding  Reports contractions are irregular, more when she needs to urinate.     Objective:     Vital signs:  Temp:  [97.6 °F (36.4 °C)-98 °F (36.7 °C)] 97.6 °F (36.4 °C)  Heart Rate:  [] 93  Resp:  [16-18] 16  BP: ()/(45-71) 92/55    Abdomen: soft, nontender  Uterus: gravid, nontender  Extremities: nontender; no edema     FHT's: Category 2  TOCO: irregular    Cervix: last check   on 19    Medications:    prenatal vitamin 27-0.8 1 tablet Oral Nightly   sodium chloride 3 mL Intravenous Q12H      •  acetaminophen  •  bisacodyl  •  lidocaine PF 1%  •  promethazine  •  promethazine  •  sodium chloride    Labs:  Lab Results   Component Value Date    HGB 11.6 2019     Lab Results   Component Value Date    GLUCOSE 72 10/08/2015       Assessment/Plan:      Luz Elena is a 28 y.o.    at 29w3d.  1.   Patient Active Problem List   Diagnosis   • Previous  section   • Previous  delivery in second trimester, antepartum   •  labor   :         Plan:   1. Stop mag at 1400- steroid time complete. Cont to watch. Consider procardia but caution due to BP        Ave Juárez MD  2019  8:08 AM

## 2019-01-05 PROCEDURE — 59025 FETAL NON-STRESS TEST: CPT

## 2019-01-05 RX ORDER — NIFEDIPINE 10 MG/1
10 CAPSULE ORAL
Status: DISCONTINUED | OUTPATIENT
Start: 2019-01-05 | End: 2019-01-06 | Stop reason: HOSPADM

## 2019-01-05 RX ADMIN — NIFEDIPINE 10 MG: 10 CAPSULE, LIQUID FILLED ORAL at 11:55

## 2019-01-05 RX ADMIN — NIFEDIPINE 10 MG: 10 CAPSULE, LIQUID FILLED ORAL at 04:22

## 2019-01-05 RX ADMIN — NIFEDIPINE 10 MG: 10 CAPSULE, LIQUID FILLED ORAL at 16:06

## 2019-01-05 RX ADMIN — SODIUM CHLORIDE, POTASSIUM CHLORIDE, SODIUM LACTATE AND CALCIUM CHLORIDE 96 ML/HR: 600; 310; 30; 20 INJECTION, SOLUTION INTRAVENOUS at 01:00

## 2019-01-05 RX ADMIN — SODIUM CHLORIDE, POTASSIUM CHLORIDE, SODIUM LACTATE AND CALCIUM CHLORIDE 96 ML/HR: 600; 310; 30; 20 INJECTION, SOLUTION INTRAVENOUS at 11:55

## 2019-01-05 RX ADMIN — NIFEDIPINE 10 MG: 10 CAPSULE, LIQUID FILLED ORAL at 20:03

## 2019-01-05 RX ADMIN — NIFEDIPINE 10 MG: 10 CAPSULE, LIQUID FILLED ORAL at 07:40

## 2019-01-05 RX ADMIN — SODIUM CHLORIDE, PRESERVATIVE FREE 10 ML: 5 INJECTION INTRAVENOUS at 20:03

## 2019-01-05 RX ADMIN — PRENATAL VIT W/ FE FUMARATE-FA TAB 27-0.8 MG 1 TABLET: 27-0.8 TAB at 21:21

## 2019-01-05 NOTE — PROGRESS NOTES
2019  HD:3  28 y.o. yo female  at 29w4d    Subjective   Luz Elena c/o some ctx's/cramping.  She was just started on procardia at 23:32.       Objective   Temp: Temp:  [97.6 °F (36.4 °C)-98.5 °F (36.9 °C)] 98.4 °F (36.9 °C) Temp src: Oral   BP: BP: ()/(50-72) 106/64        Pulse: Heart Rate:  [] 101  RR: Resp:  [16-18] 18    General:  nad   Abdomen: Gravid, nontender         Lab Results   Component Value Date    WBC 11.62 (H) 2019    HGB 11.6 2019    HCT 35.4 2019    MCV 99.2 (H) 2019     2019    ABORH A Rh Positive 10/08/2015    RUBELLAIGGIN Immune 10/08/2015    HEPBSAG NonReactive 10/08/2015       Assessment  1.   28 y.o. yo female  at 29w4d  2.   PTL, previous  deliveries    Plan  1. S/p Magnesium and ANCS's   2.   Procardia initially not started after magnesium off b/c SBP's in 90's while on it.  However, BP seems to be tolerating so far.  Due for 2nd dose.  3.   As patient feeling some ctx's, although not uncomfortable, will put patient on continuous monitoring for now.    This note has been electronically signed.    Dianne Crum MD  2019

## 2019-01-05 NOTE — NURSING NOTE
Dr. Crum returned this RN's call. Informed Dr. Crum that pt has been complaining of contractions, irregularly and that she is feeling them occasionally. Pt states that she did not really start to feel them until she was put on the monitor for her scheduled NST. Pt now is laying flat on her left side, she states that she is not really feeling any contractions now, while she is laying flat.   Dr. Juárez wrote in her note that she was waiting to start Procardia r/t pt's low blood pressures. Reviewed BPs with Dr. Crum. Telephone order received for 10 mg of Procardia PO Q6H.  Pt does not need to stay on EFM or continuous TOCO per Dr. Crum. Continue with scheduled EFM/NSTs or as needed.

## 2019-01-06 VITALS
BODY MASS INDEX: 24.16 KG/M2 | HEART RATE: 100 BPM | WEIGHT: 145 LBS | HEIGHT: 65 IN | RESPIRATION RATE: 18 BRPM | SYSTOLIC BLOOD PRESSURE: 107 MMHG | DIASTOLIC BLOOD PRESSURE: 67 MMHG | TEMPERATURE: 98.1 F

## 2019-01-06 RX ORDER — NIFEDIPINE 10 MG/1
10 CAPSULE ORAL
Qty: 120 CAPSULE | Refills: 1 | Status: SHIPPED | OUTPATIENT
Start: 2019-01-06 | End: 2019-01-07

## 2019-01-06 RX ADMIN — NIFEDIPINE 10 MG: 10 CAPSULE, LIQUID FILLED ORAL at 04:19

## 2019-01-06 RX ADMIN — NIFEDIPINE 10 MG: 10 CAPSULE, LIQUID FILLED ORAL at 12:15

## 2019-01-06 RX ADMIN — NIFEDIPINE 10 MG: 10 CAPSULE, LIQUID FILLED ORAL at 00:13

## 2019-01-06 RX ADMIN — NIFEDIPINE 10 MG: 10 CAPSULE, LIQUID FILLED ORAL at 07:37

## 2019-01-06 NOTE — PROGRESS NOTES
2019  HD:4  28 y.o. yo female  at 29w5d    Subjective   Luz Elena is doing well on her procardia.  Good FM, No LOF/VB/ctx's       Objective   Temp: Temp:  [97.4 °F (36.3 °C)-98.4 °F (36.9 °C)] 98.1 °F (36.7 °C) Temp src: Oral   BP: BP: ()/(58-69) 107/67        Pulse: Heart Rate:  [] 100  RR: Resp:  [12-18] 18    General:  nad   Abdomen: Gravid, nontender         Lab Results   Component Value Date    WBC 11.62 (H) 2019    HGB 11.6 2019    HCT 35.4 2019    MCV 99.2 (H) 2019     2019    ABORH A Rh Positive 10/08/2015    RUBELLAIGGIN Immune 10/08/2015    HEPBSAG NonReactive 10/08/2015                     Assessment  1.   28 y.o. yo female  at 29w5d  2.    labor  3.   H/o prior PTD, prior classical     Plan  1. S/p ANCS's   2.   Stable on procardia  3.   D/C home    This note has been electronically signed.    Dianne Crum MD  2019

## 2019-01-06 NOTE — DISCHARGE SUMMARY
Date of Discharge:  2019    Discharge Diagnosis:  labor, IUP @ 29wk, h/o PTD, prior classical c/s    Presenting Problem/History of Present Illness   labor [O60.00]     Hospital Course  Patient is a 28 y.o. female presented with  labor.  Given magnesium tocolysis and  corticosteroids.  Initially not started on procardia once magnesium discontinued b/c BPs were low on magnesium.  However, she started pastora again about 8hr afterwards and so procardia started b/c BP's 100's systolic by then.  She has tolerated this dose and now has been stable on it x >24hr.  [place] ready to be discharged home on Procardia 10mg q 4hr.      Procedures Performed  ANCS's       Consults:   Consults     Date and Time Order Name Status Description    2019 1303 Inpatient Maternal & Fetal Medicine Consult            Condition on Discharge:  stable    Vital Signs  Temp:  [97.4 °F (36.3 °C)-98.4 °F (36.9 °C)] 98.1 °F (36.7 °C)  Heart Rate:  [] 100  Resp:  [12-18] 18  BP: ()/(58-69) 107/67    Physical Exam:   abd NTTP    Discharge Disposition  Home or Self Care    Discharge Medications     Discharge Medications      New Medications      Instructions Start Date   NIFEdipine 10 MG capsule  Commonly known as:  PROCARDIA   10 mg, Oral, Every 4 Hours Scheduled         Continue These Medications      Instructions Start Date   PRENATAL VITAMIN PO   1 tablet, Oral, Daily      SLOW FE PO   45 mg, Oral, Daily      vitamin C 500 MG tablet  Commonly known as:  ASCORBIC ACID   500 mg, Oral, Daily      VITAMIN D-VITAMIN K PO   1 tablet, Oral, Daily         Stop These Medications    ELDERBERRY PO            Discharge Diet:     Activity at Discharge:     Follow-up Appointments  Future Appointments   Date Time Provider Department Center   2019 11:15 AM  [facility] DEPT SCHEDULE [facility] None   2019 11:30 AM [facility] US 1 [facility]  [facility]   2019  3:00 PM PAT 1 [facility]  [facility] PAT [facility]     F/u this  week with Dr. Juárez.         Dianne Crum MD  01/06/19  11:50 AM

## 2019-01-07 RX ORDER — NIFEDIPINE 10 MG/1
10 CAPSULE ORAL
Qty: 120 CAPSULE | Refills: 1 | Status: SHIPPED | OUTPATIENT
Start: 2019-01-07 | End: 2019-02-20 | Stop reason: HOSPADM

## 2019-01-07 NOTE — PAYOR COMM NOTE
"Tiago Bauer (28 y.o. Female)   Discharge Date       Date of Birth Social Security Number Address Home Phone MRN    1990  [id number]   [place] [address] [phone] [id number]    Mandaeism Marital Status          Confucianism        Admission Date Admission Type Admitting Provider Attending Provider Department, Room/Bed    [address] Juárez MD  [facility] ANTEPARTUM, [address]/    Discharge Date Discharge Disposition Discharge Destination        2019 Home or Self Care              Attending Provider:  (none)   Allergies:  No Known Allergies    Isolation:  None   Infection:  None   Code Status:  Prior    Ht:  165.1 cm (65\")   Wt:  65.8 kg (145 lb)    Admission Cmt:  None   Principal Problem:  None                Active Insurance as of 2019     Primary Coverage     Payor Plan Insurance Group Employer/Plan Group    [employer] MEDICAID [employer] MED[employer]ID [id number]     Payor Plan Address Payor Plan Phone Number Payor Plan Fax Number Effective Dates    [address] [address] [phone]  2018 - None Entered    [facility] [phone]       Subscriber Name Subscriber Birth Date Member ID       TIAGO BAUER 1990 [id number]                 Emergency Contacts      (Rel.) Home Phone Work Phone Mobile Phone    Gustavo Bauer (Spouse) [phone] -- --                 Discharge Summary      Dianne Crum MD at 2019 11:50 AM            Date of Discharge:  2019    Discharge Diagnosis:  labor, IUP @ 29wk, h/o PTD, prior classical c/s    Presenting Problem/History of Present Illness   labor [O60.00]     Hospital Course  Patient is a 28 y.o. female presented with  labor.  Given magnesium tocolysis and  corticosteroids.  Initially not started on procardia once magnesium discontinued b/c BPs were low on magnesium.  However, she started pastora again about 8hr afterwards and so procardia started b/c BP's 100's " systolic by then.  She has tolerated this dose and now has been stable on it x >24hr.  [place] ready to be discharged home on Procardia 10mg q 4hr.      Procedures Performed  ANCS's       Consults:   Consults     Date and Time Order Name Status Description    1/2/2019 1303 Inpatient Maternal & Fetal Medicine Consult            Condition on Discharge:  stable    Vital Signs  Temp:  [97.4 °F (36.3 °C)-98.4 °F (36.9 °C)] 98.1 °F (36.7 °C)  Heart Rate:  [] 100  Resp:  [12-18] 18  BP: ()/(58-69) 107/67    Physical Exam:   abd NTTP    Discharge Disposition  Home or Self Care    Discharge Medications     Discharge Medications      New Medications      Instructions Start Date   NIFEdipine 10 MG capsule  Commonly known as:  PROCARDIA   10 mg, Oral, Every 4 Hours Scheduled         Continue These Medications      Instructions Start Date   PRENATAL VITAMIN PO   1 tablet, Oral, Daily      SLOW FE PO   45 mg, Oral, Daily      vitamin C 500 MG tablet  Commonly known as:  ASCORBIC ACID   500 mg, Oral, Daily      VITAMIN D-VITAMIN K PO   1 tablet, Oral, Daily         Stop These Medications    ELDERBERRY PO            Discharge Diet:     Activity at Discharge:     Follow-up Appointments  Future Appointments   Date Time Provider Department Center   1/23/2019 11:15 AM  [facility] DEPT SCHEDULE [facility] None   1/23/2019 11:30 AM [facility] PDC US 1 BH [facility]C  [facility]   2/20/2019  3:00 PM PAT 1 [facility] BH [facility] PAT [facility]     F/u this week with Dr. Juárez.         Dianne Crum MD  01/06/19  11:50 AM            Electronically signed by Dianne Crum MD at 1/6/2019 11:56 AM

## 2019-01-23 ENCOUNTER — APPOINTMENT (OUTPATIENT)
Dept: WOMENS IMAGING | Facility: HOSPITAL | Age: 29
End: 2019-01-23
Attending: OBSTETRICS & GYNECOLOGY

## 2019-02-01 ENCOUNTER — HOSPITAL ENCOUNTER (INPATIENT)
Facility: HOSPITAL | Age: 29
LOS: 2 days | Discharge: HOME | End: 2019-02-04
Attending: OBSTETRICS & GYNECOLOGY | Admitting: OBSTETRICS & GYNECOLOGY

## 2019-02-01 PROBLEM — O47.00 PRETERM CONTRACTIONS: Status: ACTIVE | Noted: 2019-02-01

## 2019-02-01 LAB
DEPRECATED RDW RBC AUTO: 48.4 FL (ref 37–54)
ERYTHROCYTE [DISTWIDTH] IN BLOOD BY AUTOMATED COUNT: 13.4 % (ref 11.3–14.5)
HCT VFR BLD AUTO: 35.9 % (ref 34.5–44)
HGB BLD-MCNC: 12 G/DL (ref 11.5–15.5)
MCH RBC QN AUTO: 33.1 PG (ref 27–31)
MCHC RBC AUTO-ENTMCNC: 33.4 G/DL (ref 32–36)
MCV RBC AUTO: 99.2 FL (ref 80–99)
PLATELET # BLD AUTO: 188 10*3/MM3 (ref 150–450)
PMV BLD AUTO: 9.8 FL (ref 6–12)
RBC # BLD AUTO: 3.62 10*6/MM3 (ref 3.89–5.14)
WBC NRBC COR # BLD: 10.81 10*3/MM3 (ref 3.5–10.8)

## 2019-02-01 PROCEDURE — 25010000002 ONDANSETRON PER 1 MG: Performed by: OBSTETRICS & GYNECOLOGY

## 2019-02-01 PROCEDURE — 86850 RBC ANTIBODY SCREEN: CPT | Performed by: OBSTETRICS & GYNECOLOGY

## 2019-02-01 PROCEDURE — 86900 BLOOD TYPING SEROLOGIC ABO: CPT | Performed by: OBSTETRICS & GYNECOLOGY

## 2019-02-01 PROCEDURE — 85027 COMPLETE CBC AUTOMATED: CPT | Performed by: OBSTETRICS & GYNECOLOGY

## 2019-02-01 PROCEDURE — 86901 BLOOD TYPING SEROLOGIC RH(D): CPT | Performed by: OBSTETRICS & GYNECOLOGY

## 2019-02-01 PROCEDURE — 25010000002 MAGNESIUM SULFATE-LACT RINGERS 40 GM/580ML SOLUTION: Performed by: OBSTETRICS & GYNECOLOGY

## 2019-02-01 PROCEDURE — 25010000002 BETAMETHASONE ACET & SOD PHOS PER 4 MG: Performed by: OBSTETRICS & GYNECOLOGY

## 2019-02-01 PROCEDURE — 59025 FETAL NON-STRESS TEST: CPT

## 2019-02-01 RX ORDER — NIFEDIPINE 10 MG/1
10 CAPSULE ORAL ONCE
Status: COMPLETED | OUTPATIENT
Start: 2019-02-01 | End: 2019-02-01

## 2019-02-01 RX ORDER — SODIUM CHLORIDE 0.9 % (FLUSH) 0.9 %
3-10 SYRINGE (ML) INJECTION AS NEEDED
Status: DISCONTINUED | OUTPATIENT
Start: 2019-02-01 | End: 2019-02-04 | Stop reason: HOSPADM

## 2019-02-01 RX ORDER — ACETAMINOPHEN 325 MG/1
650 TABLET ORAL EVERY 4 HOURS PRN
Status: DISCONTINUED | OUTPATIENT
Start: 2019-02-01 | End: 2019-02-04 | Stop reason: HOSPADM

## 2019-02-01 RX ORDER — BETAMETHASONE SODIUM PHOSPHATE AND BETAMETHASONE ACETATE 3; 3 MG/ML; MG/ML
12 INJECTION, SUSPENSION INTRA-ARTICULAR; INTRALESIONAL; INTRAMUSCULAR; SOFT TISSUE EVERY 24 HOURS
Status: COMPLETED | OUTPATIENT
Start: 2019-02-01 | End: 2019-02-02

## 2019-02-01 RX ORDER — LIDOCAINE HYDROCHLORIDE 10 MG/ML
5 INJECTION, SOLUTION EPIDURAL; INFILTRATION; INTRACAUDAL; PERINEURAL AS NEEDED
Status: DISCONTINUED | OUTPATIENT
Start: 2019-02-01 | End: 2019-02-04 | Stop reason: HOSPADM

## 2019-02-01 RX ORDER — SODIUM CHLORIDE, SODIUM LACTATE, POTASSIUM CHLORIDE, CALCIUM CHLORIDE 600; 310; 30; 20 MG/100ML; MG/100ML; MG/100ML; MG/100ML
100 INJECTION, SOLUTION INTRAVENOUS CONTINUOUS
Status: DISCONTINUED | OUTPATIENT
Start: 2019-02-01 | End: 2019-02-04 | Stop reason: HOSPADM

## 2019-02-01 RX ORDER — MAGNESIUM SULF/RINGERS LACTATE 40 G/500ML
2 PLASTIC BAG, INJECTION (ML) INTRAVENOUS CONTINUOUS
Status: DISPENSED | OUTPATIENT
Start: 2019-02-01 | End: 2019-02-04

## 2019-02-01 RX ORDER — ONDANSETRON 2 MG/ML
4 INJECTION INTRAMUSCULAR; INTRAVENOUS EVERY 8 HOURS PRN
Status: DISCONTINUED | OUTPATIENT
Start: 2019-02-01 | End: 2019-02-04 | Stop reason: HOSPADM

## 2019-02-01 RX ORDER — NIFEDIPINE 10 MG/1
10 CAPSULE ORAL EVERY 8 HOURS SCHEDULED
Status: DISCONTINUED | OUTPATIENT
Start: 2019-02-01 | End: 2019-02-01

## 2019-02-01 RX ADMIN — BETAMETHASONE SODIUM PHOSPHATE AND BETAMETHASONE ACETATE 12 MG: 3; 3 INJECTION, SUSPENSION INTRA-ARTICULAR; INTRALESIONAL; INTRAMUSCULAR at 23:07

## 2019-02-01 RX ADMIN — SODIUM CHLORIDE, POTASSIUM CHLORIDE, SODIUM LACTATE AND CALCIUM CHLORIDE 100 ML/HR: 600; 310; 30; 20 INJECTION, SOLUTION INTRAVENOUS at 22:50

## 2019-02-01 RX ADMIN — ONDANSETRON 4 MG: 2 INJECTION INTRAMUSCULAR; INTRAVENOUS at 23:05

## 2019-02-01 RX ADMIN — NIFEDIPINE 10 MG: 10 CAPSULE ORAL at 21:55

## 2019-02-01 RX ADMIN — MAGNESIUM SULFATE HEPTAHYDRATE 2 G/HR: 500 INJECTION, SOLUTION INTRAMUSCULAR; INTRAVENOUS at 23:24

## 2019-02-02 LAB
ABO GROUP BLD: NORMAL
BLD GP AB SCN SERPL QL: NEGATIVE
RH BLD: POSITIVE
T&S EXPIRATION DATE: NORMAL

## 2019-02-02 PROCEDURE — 25010000002 MAGNESIUM SULFATE-LACT RINGERS 40 GM/580ML SOLUTION: Performed by: OBSTETRICS & GYNECOLOGY

## 2019-02-02 PROCEDURE — 99221 1ST HOSP IP/OBS SF/LOW 40: CPT | Performed by: OBSTETRICS & GYNECOLOGY

## 2019-02-02 PROCEDURE — 25010000002 BETAMETHASONE ACET & SOD PHOS PER 4 MG: Performed by: OBSTETRICS & GYNECOLOGY

## 2019-02-02 PROCEDURE — 59025 FETAL NON-STRESS TEST: CPT

## 2019-02-02 RX ORDER — SODIUM CHLORIDE 0.9 % (FLUSH) 0.9 %
3 SYRINGE (ML) INJECTION EVERY 12 HOURS SCHEDULED
Status: DISCONTINUED | OUTPATIENT
Start: 2019-02-02 | End: 2019-02-04 | Stop reason: HOSPADM

## 2019-02-02 RX ORDER — SODIUM CHLORIDE 0.9 % (FLUSH) 0.9 %
3-10 SYRINGE (ML) INJECTION AS NEEDED
Status: DISCONTINUED | OUTPATIENT
Start: 2019-02-02 | End: 2019-02-04 | Stop reason: HOSPADM

## 2019-02-02 RX ORDER — LIDOCAINE HYDROCHLORIDE 10 MG/ML
5 INJECTION, SOLUTION EPIDURAL; INFILTRATION; INTRACAUDAL; PERINEURAL AS NEEDED
Status: DISCONTINUED | OUTPATIENT
Start: 2019-02-02 | End: 2019-02-04 | Stop reason: HOSPADM

## 2019-02-02 RX ORDER — BETAMETHASONE SODIUM PHOSPHATE AND BETAMETHASONE ACETATE 3; 3 MG/ML; MG/ML
12 INJECTION, SUSPENSION INTRA-ARTICULAR; INTRALESIONAL; INTRAMUSCULAR; SOFT TISSUE EVERY 24 HOURS
Status: DISCONTINUED | OUTPATIENT
Start: 2019-02-02 | End: 2019-02-02 | Stop reason: SDUPTHER

## 2019-02-02 RX ORDER — SODIUM CHLORIDE, SODIUM LACTATE, POTASSIUM CHLORIDE, CALCIUM CHLORIDE 600; 310; 30; 20 MG/100ML; MG/100ML; MG/100ML; MG/100ML
50 INJECTION, SOLUTION INTRAVENOUS CONTINUOUS
Status: DISCONTINUED | OUTPATIENT
Start: 2019-02-02 | End: 2019-02-04 | Stop reason: HOSPADM

## 2019-02-02 RX ORDER — ONDANSETRON 2 MG/ML
4 INJECTION INTRAMUSCULAR; INTRAVENOUS EVERY 8 HOURS PRN
Status: DISCONTINUED | OUTPATIENT
Start: 2019-02-02 | End: 2019-02-02 | Stop reason: SDUPTHER

## 2019-02-02 RX ADMIN — BETAMETHASONE SODIUM PHOSPHATE AND BETAMETHASONE ACETATE 12 MG: 3; 3 INJECTION, SUSPENSION INTRA-ARTICULAR; INTRALESIONAL; INTRAMUSCULAR at 23:44

## 2019-02-02 RX ADMIN — SODIUM CHLORIDE, POTASSIUM CHLORIDE, SODIUM LACTATE AND CALCIUM CHLORIDE 100 ML/HR: 600; 310; 30; 20 INJECTION, SOLUTION INTRAVENOUS at 13:30

## 2019-02-02 RX ADMIN — SODIUM CHLORIDE, POTASSIUM CHLORIDE, SODIUM LACTATE AND CALCIUM CHLORIDE 100 ML/HR: 600; 310; 30; 20 INJECTION, SOLUTION INTRAVENOUS at 03:53

## 2019-02-02 RX ADMIN — MAGNESIUM SULFATE HEPTAHYDRATE 2 G/HR: 500 INJECTION, SOLUTION INTRAMUSCULAR; INTRAVENOUS at 17:06

## 2019-02-02 NOTE — H&P
FAITH Magallanes  Obstetric History and Physical    CC:     contractions    HPI:      Patient is a 28 y.o. female  currently at 33w4d, who presents with contractions that started earlier this evening.  They were getting closer together and uncomfortable.   She lives about an hour away and has a history of a 36 weeks vaginal birth followed by a 27 week emergent classical  at .  She was supposed to be taking the progesterone shot, but declined.  She was taking Procardia 10 mg Q 4 and did take her dose a little early based on the advice of Dr. Juárez.  It did not improve her contractions.  She denies leaking or vaginal bleeding.  +FM.   She did already have one course of Steroids on  and 1/3.   Upon arrival she was indeed pastora with some being moderate.  She received an additional 10 mg of Procardia with no improvement.  She is 1 cm dilated, but this is what she has been on a previous exam.             Prenatal Information:  Prenatal Results     Initial Prenatal Labs     Test Value Reference Range Date Time    Hemoglobin        Hematocrit        Platelets 188 10*3/mm3 150 - 450 10*3/mm3 19 2256    Rubella IgG        Hepatitis B SAg NonReactive  NonReactive 10/08/15 1606    Hepatitis C Ab        RPR        ABO A   19 1338    Rh Positive   19 1338    Antibody Screen        HIV        Urine Culture        Gonorrhea        Chlamydia        TSH 0.913 UIU/mL 0.350 - 5.350 UIU/mL 10/08/15 1606          2nd and 3rd Trimester     Test Value Reference Range Date Time    Hemoglobin (repeated) 12.0 g/dL 11.5 - 15.5 g/dL 19 2256    Hematocrit (repeated) 35.9 % 34.5 - 44.0 % 19 2256    GCT        Antibody Screen (repeated) Negative   19 1338    GTT Fasting        GTT 1 Hr        GTT 2 Hr        GTT 3 Hr        Group B Strep              Drug Screening     Test Value Reference Range Date Time    Amphetamine Screen        Barbiturate Screen        Benzodiazepine Screen         Methadone Screen        Phencyclidine Screen        Opiates Screen        THC Screen        Cocaine Screen        Propoxyphene Screen        Buprenorphine Screen        Methamphetamine Screen        Oxycodone Screen        Tricyclic Antidepressants Screen              Other (Risk screening)     Test Value Reference Range Date Time    Varicella IgG        Parvovirus IgG        CMV IgG        Cystic Fibrosis        Hemoglobin electrophoresis        NIPT        MSAFP-4        AFP (for NTD only)                  External Prenatal Results     Pregnancy Outside Results - Transcribed From Office Records - See Scanned Records For Details     Test Value Date Time    Hgb 12.0 g/dL 02/01/19 2256    Hct 35.9 % 02/01/19 2256    ABO A  01/02/19 1338    Rh Positive  01/02/19 1338    Antibody Screen Negative  01/02/19 1338    Glucose Fasting GTT       Glucose Tolerance Test 1 hour       Glucose Tolerance Test 3 hour       Gonorrhea (discrete)       Chlamydia (discrete)       RPR       VDRL       Syphilis Antibody       Rubella       HBsAg NonReactive  10/08/15 1606    Herpes Simplex Virus PCR       Herpes Simplex VIrus Culture       HIV       Hep C RNA Quant PCR       Hep C Antibody       AFP       Group B Strep       GBS Susceptibility to Clindamycin       GBS Susceptibility to Erythromycin       Fetal Fibronectin       Genetic Testing, Maternal Blood             Drug Screening     Test Value Date Time    Urine Drug Screen       Amphetamine Screen Negative ng/mL 10/08/15 1606    Barbiturate Screen Negative ng/mL 10/08/15 1606    Benzodiazepine Screen Negative ng/mL 10/08/15 1606    Methadone Screen Negative ng/mL 10/08/15 1606    Phencyclidine Screen Negative ng/mL 10/08/15 1606    Opiates Screen       THC Screen       Cocaine Screen       Propoxyphene Screen Negative ng/mL 10/08/15 1606    Buprenorphine Screen Negative ng/mL 10/08/15 1606    Methamphetamine Screen       Oxycodone Screen Negative ng/mL 10/08/15 1606    Tricyclic  Antidepressants Screen                    Past OB History:     Obstetric History       T0      L2     SAB1   TAB0   Ectopic0   Molar0   Multiple0   Live Births2       # Outcome Date GA Lbr Duane/2nd Weight Sex Delivery Anes PTL Lv   4 Current            3 SAB 2017 5w0d          2  02/15/16 27w2d  1247 g (2 lb 12 oz) F CS-Unspec  Y GLNEN      Complications:  premature rupture of membranes   1  14 36w4d  2722 g (6 lb) F Vag-Spont  N GLENN      Obstetric Comments   Classical incision       Past Medical History: Past Medical History:   Diagnosis Date   • Abnormal Pap smear of cervix     follow up normal   • H/O  section    • HPV in female       Past Surgical History Past Surgical History:   Procedure Laterality Date   •  SECTION  02/15/2016   • SHOULDER SURGERY      Right shoulder, posterior capsule release      Family History: History reviewed. No pertinent family history.   Social History:  reports that  has never smoked. she has never used smokeless tobacco.   reports that she does not drink alcohol.   reports that she does not use drugs.        Review of Systems:  Denies Chest pain, SOA, RUQ pain, N/V/F/C.   Rashes  Or muscle weakness      Objective     Vital Signs Range for the last 24 hours  Temperature: Temp:  [97.8 °F (36.6 °C)-98.2 °F (36.8 °C)] 98.2 °F (36.8 °C)   Temp Source: Temp src: Oral   BP: BP: (105-121)/(59-72) 105/59   Pulse: Heart Rate:  [] 86   Respirations: Resp:  [16] 16   SPO2:     O2 Amount (l/min):     O2 Devices     Weight: Weight:  [68 kg (150 lb)] 68 kg (150 lb)     Physical Examination: General appearance - oriented to person, place, and time and some response when she feels contractions, but no distress  Chest - no tachypnea, retractions or cyanosis  Heart - normal rate and regular rhythm, S1 and S2 normal  Abdomen - soft, nontender, nondistended, no masses or organomegaly  no rebound tenderness noted  bowel sounds  normal  Extremities - no pedal edema noted        Cervix: Exam by:     Dilation: Cervical Dilation (cm): 1   Effacement: Cervical Effacement: 60%   Station:       Fetal Heart Rate Assessment   Method: Fetal HR Assessment Method: external   Beats/min: Fetal HR (beats/min): 135   Baseline: Fetal Heart Baseline Rate: normal range   Variability: Fetal HR Variability: moderate (amplitude range 6 to 25 bpm)   Accels: Fetal HR Accelerations: greater than/equal to 15 bpm   Decels: Fetal HR Decelerations: absent   Tracing Category:       Uterine Assessment   Method: Method: external tocotransducer   Frequency (min): Contraction Frequency (Minutes): 4-5   Ctx Count in 10 min: Contractions in 10 Minutes: 120-140   Duration:     Intensity: Contraction Intensity: (pt reports feeling less strong, but still frequent ctx)   Intensity by IUPC:     Resting Tone: Uterine Resting Tone: soft by palpation   Resting Tone by IUPC:     [place] Units:         Assessment/Plan        contractions      Assessment & Plan    Assessment:  1.  Intrauterine pregnancy at 33w4d gestation with reassuring fetal status.    2.   contractions   3.  History of a classical C/S     Plan:  1.  Admit  2. IV, CBC, T&S  3. Given that she failed to respond to Procardia, moved to magnesium 4 gm load followed by 2gms/ hr to attempt to stop contractions and try and get in a rescue course of steroids  4. BMZ x2 for a rescue course  5. PDC scan Monday if she is still pregnant by then  6. Dr. Juárez notified      Juan R Apodaca MD  2019  12:28 AM

## 2019-02-02 NOTE — PROGRESS NOTES
HD#2  33+4 with  contractions    Pt reports contractions felt like they stopped a bit but felt a few more this AM. She reports not intense, just present. They usually stop with urination.  AFVSS   FHTs +accels  toco overnight q2-20 minutes  cvx was 1 ast PM (stable from exam at 29 weeks.)\    A/P Pt with h/o classical incision. Pt with non laboring contractions. Will cont mag through rescue dose steroids (last given at 29 wks). Will get PDC consult on

## 2019-02-02 NOTE — PLAN OF CARE
Problem: Patient Care Overview  Goal: Plan of Care Review  Outcome: Ongoing (interventions implemented as appropriate)    Goal: Discharge Needs Assessment  Outcome: Ongoing (interventions implemented as appropriate)    Goal: Interprofessional Rounds/Family Conf  Outcome: Ongoing (interventions implemented as appropriate)      Problem:  Labor (Adult,Obstetrics,Pediatric)  Goal: Signs and Symptoms of Listed Potential Problems Will be Absent, Minimized or Managed ( Labor)  Outcome: Ongoing (interventions implemented as appropriate)

## 2019-02-02 NOTE — PLAN OF CARE
Problem: Patient Care Overview  Goal: Plan of Care Review  Outcome: Ongoing (interventions implemented as appropriate)    Goal: Individualization and Mutuality   19   Individualization   Patient Specific Preferences Pt does not want baby to receive erythromycin, vitamin K, or Hep B vaccine.   Patient Specific Goals (Include Timeframe) healthy mom, healthy baby       Problem:  Labor (Adult,Obstetrics,Pediatric)  Goal: Signs and Symptoms of Listed Potential Problems Will be Absent, Minimized or Managed ( Labor)   19   Goal/Outcome Evaluation   Problems Assessed ( Labor) all   Problems Present ( Labor) none

## 2019-02-03 PROCEDURE — 59025 FETAL NON-STRESS TEST: CPT

## 2019-02-03 PROCEDURE — 25010000002 MAGNESIUM SULFATE-LACT RINGERS 40 GM/580ML SOLUTION: Performed by: OBSTETRICS & GYNECOLOGY

## 2019-02-03 RX ORDER — DEXTROSE AND SODIUM CHLORIDE 5; .2 G/100ML; G/100ML
96 INJECTION, SOLUTION INTRAVENOUS CONTINUOUS
Status: DISCONTINUED | OUTPATIENT
Start: 2019-02-03 | End: 2019-02-04 | Stop reason: HOSPADM

## 2019-02-03 RX ADMIN — MAGNESIUM SULFATE HEPTAHYDRATE 2 G/HR: 500 INJECTION, SOLUTION INTRAMUSCULAR; INTRAVENOUS at 14:52

## 2019-02-03 NOTE — PROGRESS NOTES
HD#3  33+5 with  contractions     Pt reports contractions have slowed dramatically.  AFVSS   FHTs +accels  toco about 1 ctx/ h  cvx was 1 on  (stable from exam at 29 weeks.)\     A/P Pt with h/o classical incision. Pt with non laboring contractions. Will cont mag through rescue dose steroids (last given at 29 wks). Will get PDC consult on

## 2019-02-04 ENCOUNTER — APPOINTMENT (OUTPATIENT)
Dept: WOMENS IMAGING | Facility: HOSPITAL | Age: 29
End: 2019-02-04

## 2019-02-04 VITALS
SYSTOLIC BLOOD PRESSURE: 109 MMHG | RESPIRATION RATE: 16 BRPM | TEMPERATURE: 97.8 F | HEART RATE: 84 BPM | WEIGHT: 150 LBS | BODY MASS INDEX: 24.99 KG/M2 | OXYGEN SATURATION: 97 % | HEIGHT: 65 IN | DIASTOLIC BLOOD PRESSURE: 65 MMHG

## 2019-02-04 PROCEDURE — 76816 OB US FOLLOW-UP PER FETUS: CPT | Performed by: OBSTETRICS & GYNECOLOGY

## 2019-02-04 PROCEDURE — 76816 OB US FOLLOW-UP PER FETUS: CPT

## 2019-02-04 RX ORDER — DOCUSATE SODIUM 100 MG/1
100 CAPSULE, LIQUID FILLED ORAL ONCE
Status: COMPLETED | OUTPATIENT
Start: 2019-02-04 | End: 2019-02-04

## 2019-02-04 RX ORDER — NIFEDIPINE 10 MG/1
10 CAPSULE ORAL
Status: DISCONTINUED | OUTPATIENT
Start: 2019-02-04 | End: 2019-02-04 | Stop reason: HOSPADM

## 2019-02-04 RX ADMIN — SODIUM CHLORIDE, POTASSIUM CHLORIDE, SODIUM LACTATE AND CALCIUM CHLORIDE 100 ML/HR: 600; 310; 30; 20 INJECTION, SOLUTION INTRAVENOUS at 05:00

## 2019-02-04 RX ADMIN — NIFEDIPINE 10 MG: 10 CAPSULE ORAL at 10:39

## 2019-02-04 RX ADMIN — DOCUSATE SODIUM 100 MG: 100 CAPSULE, LIQUID FILLED ORAL at 10:39

## 2019-02-04 RX ADMIN — NIFEDIPINE 10 MG: 10 CAPSULE ORAL at 06:30

## 2019-02-04 NOTE — DISCHARGE SUMMARY
Patient presented with contractions and h/o  delivery. She received Mag and steroids (rescue doise- 1st given at 29 weeks). Contractions subsided. USG by PDC withinb normal limits. Patient placed back on procardia with stability. Dc home and follow up Thursday as scheduled

## 2019-02-04 NOTE — PAYOR COMM NOTE
"Tiago Bauer (28 y.o. Female)   Inpatient Clinicals      Date of Birth Social Security Number Address Home Phone MRN    1990  [id number]   [place] [address] [phone] [id number]    Anglican Marital Status          Druze        Admission Date Admission Type Admitting Provider Attending Provider Department, Room/Bed    [address] Juárez MD Ashmun, Julie N, MD [facility] ANTEPARTUM, N324    Discharge Date Discharge Disposition Discharge Destination         Home or Self Care              Attending Provider:  Ave Juárez MD    Allergies:  No Known Allergies    Isolation:  None   Infection:  None   Code Status:  CPR    Ht:  165.1 cm (65\")   Wt:  68 kg (150 lb)    Admission Cmt:  None   Principal Problem:  None                Active Insurance as of 2019     Primary Coverage     Payor Plan Insurance Group Employer/Plan Group    ANTHE[place] MEDICAID AN[place]M MEDICAID [id number]     Payor Plan Address Payor Plan Phone Number Payor Plan Fax Number Effective Dates    [address] [address] [phone]  2018 - None Entered    [facility] [id number]       Subscriber Name Subscriber Birth Date Member ID       TIAGO BAUER 1990 [id number]                 Emergency Contacts      (Rel.) Home Phone Work Phone Mobile Phone    Gustavo Bauer (Spouse) [phone] -- --            Treatment Team     Provider Relationship Specialty Contact    Ave Juárez MD Attending, Physician of Record Obstetrics and Gynecology  [phone]    Ambreen Mackay, RD Dietitian Nutrition  [phone]    Tierney Bear, RN Registered Nurse --            Problem List           Codes Noted - Resolved       Hospital     contractions ICD-10-CM: O47.9  ICD-9-CM: 644.00 2019 - Present       Non-Hospital     labor ICD-10-CM: O60.00  ICD-9-CM: 644.00 2019 - Present    Previous  section ICD-10-CM: Z98.891  ICD-9-CM: V45.89 10/24/2018 - Present "    Previous  delivery in second trimester, antepartum ICD-10-CM: O09.212  ICD-9-CM: V23.41 10/24/2018 - Present             History & Physical      Juan R Apodaca MD at 2019 12:28 AM          [facility]  Obstetric History and Physical    CC:     contractions    HPI:      Patient is a 28 y.o. female  currently at 33w4d, who presents with contractions that started earlier this evening.  They were getting closer together and uncomfortable.   She lives about an hour away and has a history of a 36 weeks vaginal birth followed by a 27 week emergent classical  at .  She was supposed to be taking the progesterone shot, but declined.  She was taking Procardia 10 mg Q 4 and did take her dose a little early based on the advice of Dr. Juárez.  It did not improve her contractions.  She denies leaking or vaginal bleeding.  +FM.   She did already have one course of Steroids on  and 1/3.   Upon arrival she was indeed pastora with some being moderate.  She received an additional 10 mg of Procardia with no improvement.  She is 1 cm dilated, but this is what she has been on a previous exam.             Prenatal Information:  Prenatal Results     Initial Prenatal Labs     Test Value Reference Range Date Time    Hemoglobin        Hematocrit        Platelets 188 10*3/mm3 150 - 450 10*3/mm3 19 2256    Rubella IgG        Hepatitis B SAg NonReactive  NonReactive 10/08/15 1606    Hepatitis C Ab        RPR        ABO A   19 1338    Rh Positive   19 1338    Antibody Screen        HIV        Urine Culture        Gonorrhea        Chlamydia        TSH 0.913 UIU/mL 0.350 - 5.350 UIU/mL 10/08/15 1606          2nd and 3rd Trimester     Test Value Reference Range Date Time    Hemoglobin (repeated) 12.0 g/dL 11.5 - 15.5 g/dL 19 2256    Hematocrit (repeated) 35.9 % 34.5 - 44.0 % 19 2256    GCT        Antibody Screen (repeated) Negative   19 1338    GTT Fasting        GTT 1 Hr         GTT 2 Hr        GTT 3 Hr        Group B Strep              Drug Screening     Test Value Reference Range Date Time    Amphetamine Screen        Barbiturate Screen        Benzodiazepine Screen        Methadone Screen        Phencyclidine Screen        Opiates Screen        THC Screen        Cocaine Screen        Propoxyphene Screen        Buprenorphine Screen        Methamphetamine Screen        Oxycodone Screen        Tricyclic Antidepressants Screen              Other (Risk screening)     Test Value Reference Range Date Time    Varicella IgG        Parvovirus IgG        CMV IgG        Cystic Fibrosis        Hemoglobin electrophoresis        NIPT        MSAFP-4        AFP (for NTD only)                  External Prenatal Results     Pregnancy Outside Results - Transcribed From Office Records - See Scanned Records For Details     Test Value Date Time    Hgb 12.0 g/dL 02/01/19 2256    Hct 35.9 % 02/01/19 2256    ABO A  01/02/19 1338    Rh Positive  01/02/19 1338    Antibody Screen Negative  01/02/19 1338    Glucose Fasting GTT       Glucose Tolerance Test 1 hour       Glucose Tolerance Test 3 hour       Gonorrhea (discrete)       Chlamydia (discrete)       RPR       VDRL       Syphilis Antibody       Rubella       HBsAg NonReactive  10/08/15 1606    Herpes Simplex Virus PCR       Herpes Simplex VIrus Culture       HIV       Hep C RNA Quant PCR       Hep C Antibody       AFP       Group B Strep       GBS Susceptibility to Clindamycin       GBS Susceptibility to Erythromycin       Fetal Fibronectin       Genetic Testing, Maternal Blood             Drug Screening     Test Value Date Time    Urine Drug Screen       Amphetamine Screen Negative ng/mL 10/08/15 1606    Barbiturate Screen Negative ng/mL 10/08/15 1606    Benzodiazepine Screen Negative ng/mL 10/08/15 1606    Methadone Screen Negative ng/mL 10/08/15 1606    Phencyclidine Screen Negative ng/mL 10/08/15 1606    Opiates Screen       THC Screen       Cocaine  Screen       Propoxyphene Screen Negative ng/mL 10/08/15 1606    Buprenorphine Screen Negative ng/mL 10/08/15 1606    Methamphetamine Screen       Oxycodone Screen Negative ng/mL 10/08/15 1606    Tricyclic Antidepressants Screen                    Past OB History:     Obstetric History       T0      L2     SAB1   TAB0   Ectopic0   Molar0   Multiple0   Live Births2       # Outcome Date GA Lbr Duane/2nd Weight Sex Delivery Anes PTL Lv   4 Current            3 SAB 2017 5w0d          2  02/15/16 27w2d  1247 g (2 lb 12 oz) F CS-Unspec  Y GLENN      Complications:  premature rupture of membranes   1  14 36w4d  2722 g (6 lb) F Vag-Spont  N GLENN      Obstetric Comments   Classical incision       Past Medical History: Past Medical History:   Diagnosis Date   • Abnormal Pap smear of cervix     follow up normal   • H/O  section    • HPV in female       Past Surgical History Past Surgical History:   Procedure Laterality Date   •  SECTION  02/15/2016   • SHOULDER SURGERY      Right shoulder, posterior capsule release      Family History: History reviewed. No pertinent family history.   Social History:  reports that  has never smoked. she has never used smokeless tobacco.   reports that she does not drink alcohol.   reports that she does not use drugs.        Review of Systems:  Denies Chest pain, SOA, RUQ pain, N/V/F/C.   Rashes  Or muscle weakness      Objective     Vital Signs Range for the last 24 hours  Temperature: Temp:  [97.8 °F (36.6 °C)-98.2 °F (36.8 °C)] 98.2 °F (36.8 °C)   Temp Source: Temp src: Oral   BP: BP: (105-121)/(59-72) 105/59   Pulse: Heart Rate:  [] 86   Respirations: Resp:  [16] 16   SPO2:     O2 Amount (l/min):     O2 Devices     Weight: Weight:  [68 kg (150 lb)] 68 kg (150 lb)     Physical Examination: General appearance - oriented to person, place, and time and some response when she feels contractions, but no distress  Chest - no  tachypnea, retractions or cyanosis  Heart - normal rate and regular rhythm, S1 and S2 normal  Abdomen - soft, nontender, nondistended, no masses or organomegaly  no rebound tenderness noted  bowel sounds normal  Extremities - no pedal edema noted        Cervix: Exam by:     Dilation: Cervical Dilation (cm): 1   Effacement: Cervical Effacement: 60%   Station:       Fetal Heart Rate Assessment   Method: Fetal HR Assessment Method: external   Beats/min: Fetal HR (beats/min): 135   Baseline: Fetal Heart Baseline Rate: normal range   Variability: Fetal HR Variability: moderate (amplitude range 6 to 25 bpm)   Accels: Fetal HR Accelerations: greater than/equal to 15 bpm   Decels: Fetal HR Decelerations: absent   Tracing Category:       Uterine Assessment   Method: Method: external tocotransducer   Frequency (min): Contraction Frequency (Minutes): 4-5   Ctx Count in 10 min: Contractions in 10 Minutes: 120-140   Duration:     Intensity: Contraction Intensity: (pt reports feeling less strong, but still frequent ctx)   Intensity by IUPC:     Resting Tone: Uterine Resting Tone: soft by palpation   Resting Tone by IUPC:     Heather Units:         Assessment/Plan        contractions      Assessment & Plan    Assessment:  1.  Intrauterine pregnancy at 33w4d gestation with reassuring fetal status.    2.   contractions   3.  History of a classical C/S     Plan:  1.  Admit  2. IV, CBC, T&S  3. Given that she failed to respond to Procardia, moved to magnesium 4 gm load followed by 2gms/ hr to attempt to stop contractions and try and get in a rescue course of steroids  4. BMZ x2 for a rescue course  5. PDC scan Monday if she is still pregnant by then  6. Dr. Juárez notified      Juan R Apodaca MD  2019  12:28 AM    Electronically signed by Juan R Apodaca MD at 2019 12:39 AM       ICU Vital Signs     Row Name 19 0806 19 0633 19 0630 19 0500 19 0427       Vitals    Temp  97.8 °F  (36.6 °C)  --  --  --  --    Temp src  Oral  --  --  --  --    Pulse  84  85  85  --  --    Heart Rate Source  Monitor  --  --  --  --    Resp  16  --  --  16  16    Resp Rate Source  Visual  --  --  Visual  Visual    BP  109/65  103/57  103/57  --  --    BP Location  Left arm  --  --  --  --    BP Method  Automatic  --  --  --  --    Patient Position  Sitting  --  --  --  --    Row Name 02/04/19 0421 02/04/19 0357 02/04/19 0321 02/04/19 0309 02/04/19 0221       Vitals    Pulse  79  --  81  --  80    Resp  --  16  --  16  --    Resp Rate Source  --  Visual  --  Visual  --    BP  85/52  (Abnormal)   --  96/55  --  100/55    Row Name 02/04/19 0124 02/04/19 0122 02/04/19 0021 02/04/19 0012 02/04/19 0008       Vitals    Temp  --  --  --  --  97.9 °F (36.6 °C)    Temp src  --  --  --  --  Oral    Pulse  --  78  81  --  --    Resp  14  --  --  16  16    Resp Rate Source  Visual  --  --  Visual  Visual    BP  --  91/52  97/53  --  --    Patient Position  --  --  --  --  Lying    Row Name 02/03/19 2328 02/03/19 2321 02/03/19 2303 02/03/19 2222 02/03/19 2205       Vitals    Pulse  --  80  108  81  --    Resp  16  --  --  --  16    Resp Rate Source  Visual  --  --  --  Visual    BP  --  98/56  104/58  85/51  (Abnormal)   --    Row Name 02/03/19 2121 02/03/19 2112 02/03/19 2021 02/03/19 1956 02/03/19 1922       Vitals    Temp  --  --  --  97.7 °F (36.5 °C)  --    Temp src  --  --  --  Oral  --    Pulse  81  --  89  --  82    Resp  --  16  --  16  --    Resp Rate Source  --  Stethoscope  --  Visual  --    BP  104/61  --  101/58  --  100/59       Oxygen Therapy    Device (Oxygen Therapy)  --  --  --  room air  --    Row Name 02/03/19 1851 02/03/19 1821 02/03/19 1746 02/03/19 1721 02/03/19 1652       Vitals    Pulse  --  88  --  92  --    Resp  16  --  16  --  16    Resp Rate Source  Visual  --  Visual  --  Visual    BP  --  110/67  --  104/67  --    Row Name 02/03/19 1621 02/03/19 1539 02/03/19 1530 02/03/19 1521 02/03/19 1445        Vitals    Temp  --  --  97.8 °F (36.6 °C)  --  --    Temp src  --  --  Oral  --  --    Pulse  86  --  --  84  --    Resp  --  16  16  --  16    Resp Rate Source  --  Visual  Visual  --  Visual    BP  105/63  --  --  100/56  --    Row Name 02/03/19 1421 02/03/19 1343 02/03/19 1321 02/03/19 1245 02/03/19 1224       Vitals    Pulse  96  --  96  --  100    Resp  --  16  --  16  --    Resp Rate Source  --  Visual  --  Visual  --    BP  102/57  --  89/50  (Abnormal)   --  108/61    Row Name 02/03/19 1141 02/03/19 1133 02/03/19 1121 02/03/19 1027 02/03/19 0921       Vitals    Temp  --  97.6 °F (36.4 °C)  --  --  --    Temp src  --  Oral  --  --  --    Pulse  --  --  95  100  101    Resp  16  16  --  16  --    Resp Rate Source  Visual  Visual  --  Visual  --    BP  --  --  97/51  99/56  101/61    Row Name 02/03/19 0821 02/03/19 0752 02/03/19 0744 02/03/19 0735 02/03/19 0721       Vitals    Temp  --  --  --  98 °F (36.7 °C)  --    Temp src  --  --  --  Oral  --    Pulse  104  --  92  --  81    Resp  --  16  --  16  --    Resp Rate Source  --  Visual  --  Visual  --    BP  96/57  --  102/60  --  92/53    Row Name 02/03/19 0658 02/03/19 0621 02/03/19 0600 02/03/19 0531 02/03/19 0521       Vitals    Pulse  --  90  --  96  94    Resp  16  --  16  --  --    Resp Rate Source  Visual  --  Visual  --  --    BP  --  93/55  --  114/63  80/42  (Abnormal)  cuff loose    Row Name 02/03/19 0500 02/03/19 0421 02/03/19 0415 02/03/19 0400 02/03/19 0300       Vitals    Temp  --  --  97.7 °F (36.5 °C)  --  --    Temp src  --  --  Oral  --  --    Pulse  --  89  --  --  --    Resp  16  --  14 16  16    Resp Rate Source  Visual  --  Visual  Visual  Visual    BP  --  80/46  (Abnormal)  cuff loose  --  --  --    Row Name 02/03/19 0200 02/03/19 0100 02/03/19 0000 02/02/19 2346 02/02/19 2300       Vitals    Temp  --  --  --  97.7 °F (36.5 °C)  --    Temp src  --  --  --  Oral  --    Resp  16  16  16  16  16    Resp Rate Source  Visual  Visual   Visual  Visual  Visual    Row Name 02/02/19 2200 02/02/19 2121 02/02/19 2043 02/02/19 2021 02/02/19 1950       Vitals    Pulse  --  93  --  93  --    Resp  16  --  16  --  16    Resp Rate Source  Visual  --  Visual  --  Visual    BP  --  105/55  --  95/53  --       Oxygen Therapy    Device (Oxygen Therapy)  --  --  --  --  room air    Row Name 02/02/19 1940 02/02/19 1500 02/02/19 1421 02/02/19 1400 02/02/19 1357       Vitals    Temp  98.2 °F (36.8 °C)  97.4 °F (36.3 °C)  --  --  97.8 °F (36.6 °C)    Temp src  Oral  --  --  --  Oral    Pulse  100  --  94  --  95    Heart Rate Source  Monitor  --  --  --  --    Resp  16  18  --  16  --    Resp Rate Source  Visual  --  --  Visual  --    BP  100/58  --  106/59  --  101/57    BP Location  Left arm  --  --  --  --    BP Method  Automatic  --  --  --  --    Patient Position  Lying  --  --  --  --    Row Name 02/02/19 1300 02/02/19 1258 02/02/19 1211 02/02/19 1100 02/02/19 1057       Vitals    Pulse  --  88  108  --  98    Resp  16  --  16  16  --    Resp Rate Source  Visual  --  Visual  --  --    BP  --  101/57  114/66  --  100/57    Row Name 02/02/19 1000 02/02/19 0958 02/02/19 0900 02/02/19 0800 02/02/19 0757       Vitals    Pulse  --  117  109  --  109    Resp  16  --  16  16  --    BP  --  99/56  105/60  --  100/59    Row Name 02/02/19 0715 02/02/19 0710 02/02/19 0657 02/02/19 0655 02/02/19 0557       Vitals    Temp  98 °F (36.7 °C)  --  --  --  --    Temp src  Oral  --  --  --  --    Pulse  --  --  109  --  100    Resp  --  --  --  16  --    Resp Rate Source  --  --  --  Visual  --    BP  --  --  94/60  --  93/50       Oxygen Therapy    Device (Oxygen Therapy)  --  room air  --  --  --    Row Name 02/02/19 0457 02/02/19 0456 02/02/19 0400 02/02/19 0300 02/02/19 0200       Vitals    Pulse  93  --  115  112  90    Resp  --  16  18  16  16    Resp Rate Source  --  Visual  Visual  Visual  Visual    BP  93/50  --  100/57  98/55  90/51    BP Location  --  --  --  Left arm   "--    BP Method  --  --  --  Automatic  --    Patient Position  --  --  --  Lying  --    Row Name 02/02/19 0057 02/02/19 0042 02/02/19 0026 02/01/19 2357 02/01/19 2325       Vitals    Temp  --  --  97.9 °F (36.6 °C)  --  --    Temp src  --  --  Oral  --  --    Pulse  87  88  86  --  113    Resp  16  --  16  --  16    Resp Rate Source  Visual  --  Visual  --  Visual    BP  112/66  106/60  105/59  --  112/66       Oxygen Therapy    SpO2  --  --  --  97 %  --    Pulse Oximetry Type  --  --  --  Intermittent  --    Device (Oxygen Therapy)  --  --  --  room air  --    Row Name 02/01/19 2303 02/01/19 2147 02/01/19 2137 02/01/19 2134          Height and Weight    Height  --  --  165.1 cm (65\")  --     Height Method  --  --  Stated  --     Weight  --  --  68 kg (150 lb)  --     Weight Method  --  --  Stated  --     Ideal Body Weight (IBW) (kg)  --  --  57.29  --     BSA (Calculated - sq m)  --  --  1.75 sq meters  --     BMI (Calculated)  --  --  25  --     Weight in (lb) to have BMI = 25  --  --  149.9  --        Vitals    Temp  98.2 °F (36.8 °C)  --  --  97.8 °F (36.6 °C)     Temp src  Oral  --  --  Oral     Pulse  99  --  --  96     Resp  16  --  --  16     Resp Rate Source  Visual  --  --  Visual     BP  121/71  --  --  121/72        Patient Observation    Observations  --  RN called laborist (Dr. Apodaca) about pt status, contractions, SVE. MD ordered procardia given now (see orders) and to reevaluate in 30 min. If no improvement in contractions contact MD and admit patient.  --  --         Hospital Medications (all)       Dose Frequency Start End    acetaminophen (TYLENOL) tablet 650 mg 650 mg Every 4 Hours PRN 2/1/2019     Sig - Route: Take 2 tablets by mouth Every 4 (Four) Hours As Needed for Mild Pain  or Headache. - Oral    betamethasone acetate-betamethasone sodium phosphate (CELESTONE SOLUSPAN) injection 12 mg 12 mg Every 24 Hours 2/1/2019 2/2/2019    Sig - Route: Inject 2 mL into the appropriate muscle as " directed by prescriber Daily. - Intramuscular    dextrose 5 % and sodium chloride 0.2 % infusion 96 mL/hr Continuous 2/3/2019     Sig - Route: Infuse 96 mL/hr into a venous catheter Continuous. - Intravenous    docusate sodium (COLACE) capsule 100 mg 100 mg Once 2/4/2019     Sig - Route: Take 1 capsule by mouth 1 (One) Time. - Oral    lactated ringers infusion 100 mL/hr Continuous 2/1/2019     Sig - Route: Infuse 100 mL/hr into a venous catheter Continuous. - Intravenous    lactated ringers infusion 50 mL/hr Continuous 2/2/2019     Sig - Route: Infuse 50 mL/hr into a venous catheter Continuous. - Intravenous    lidocaine PF 1% (XYLOCAINE) injection 5 mL 5 mL As Needed 2/1/2019     Sig - Route: Inject 5 mL into the appropriate area of the skin as directed by provider As Needed (IV starts). - Intradermal    lidocaine PF 1% (XYLOCAINE) injection 5 mL 5 mL As Needed 2/2/2019     Sig - Route: Inject 5 mL into the appropriate area of the skin as directed by provider As Needed (IV starts). - Intradermal    magnesium sulfate bolus from bag 0.07 g/mL solution 4 g 4 g Once 2/1/2019 2/1/2019    Sig - Route: Infuse 57.14 mL into a venous catheter 1 (One) Time. - Intravenous    Magnesium Sulfate-Lact Ringers 40 GM/580ML 2 g/hr Continuous 2/1/2019 2/4/2019    Sig - Route: Infuse 2 g/hr into a venous catheter Continuous. - Intravenous    NIFEdipine (PROCARDIA) capsule 10 mg 10 mg Once 2/1/2019 2/1/2019    Sig - Route: Take 1 capsule by mouth 1 (One) Time. - Oral    NIFEdipine (PROCARDIA) capsule 10 mg 10 mg Every 4 Hours Scheduled 2/4/2019     Sig - Route: Take 1 capsule by mouth Every 4 (Four) Hours. - Oral    ondansetron (ZOFRAN) injection 4 mg 4 mg Every 8 Hours PRN 2/1/2019     Sig - Route: Infuse 2 mL into a venous catheter Every 8 (Eight) Hours As Needed for Nausea or Vomiting. - Intravenous    sodium chloride 0.9 % flush 3 mL 3 mL Every 12 Hours Scheduled 2/2/2019     Sig - Route: Infuse 3 mL into a venous catheter Every 12  (Twelve) Hours. - Intravenous    sodium chloride 0.9 % flush 3-10 mL 3-10 mL As Needed 2019     Sig - Route: Infuse 3-10 mL into a venous catheter As Needed for Line Care. - Intravenous    sodium chloride 0.9 % flush 3-10 mL 3-10 mL As Needed 2019     Sig - Route: Infuse 3-10 mL into a venous catheter As Needed for Line Care. - Intravenous    betamethasone acetate-betamethasone sodium phosphate (CELESTONE SOLUSPAN) injection 12 mg (Discontinued) 12 mg Every 24 Hours 2019    Sig - Route: Inject 2 mL into the appropriate muscle as directed by prescriber Daily. - Intramuscular    Reason for Discontinue: Duplicate order    NIFEdipine (PROCARDIA) capsule 10 mg (Discontinued) 10 mg Every 8 Hours Scheduled 2019    Sig - Route: Take 1 capsule by mouth Every 8 (Eight) Hours. - Oral    ondansetron (ZOFRAN) injection 4 mg (Discontinued) 4 mg Every 8 Hours PRN 2019    Sig - Route: Infuse 2 mL into a venous catheter Every 8 (Eight) Hours As Needed for Nausea or Vomiting. - Intravenous    Reason for Discontinue: Duplicate order          Lab Results (last 72 hours)     Procedure Component Value Units Date/Time    CBC (No Diff) [[id number]]  (Abnormal) Collected:  19    Specimen:  Blood Updated:  19 2341     WBC 10.81 10*3/mm3      RBC 3.62 10*6/mm3      Hemoglobin 12.0 g/dL      Hematocrit 35.9 %      MCV 99.2 fL      MCH 33.1 pg      MCHC 33.4 g/dL      RDW 13.4 %      RDW-SD 48.4 fl      MPV 9.8 fL      Platelets 188 10*3/mm3           Imaging Results (last 72 hours)     Procedure Component Value Units Date/Time    Pending sale to [facility] [[id number]] Collected:  19     Updated:  19    Narrative:       PAT NAME: TIAGO BAUER  MED REC#: [id number]  BIRTH DA: 1990  PAT GEND: F  ACCOUNT#: [id number]  PAT TYPE: I  EXAM YAHIR: [id number]  REF PHYS BRAN DAILY  ACCESSION [id number]      Patient  "Status  ===========  Inpatient  Indication  ========   labor. Previous  delivery with classical . Size greater than dates, polyhydramnios  History  ======  General History  Other: BMI 25.0  Method  ======  Transabdominal ultrasound examination. View: Adequate view  Pregnancy  =========  [place]illing[place] pregnancy. Number of fetuses: 1.  Dating  ======  Method of dating: based on the external assessment  GA by \"stated dating\" 33 w + 6 d  GINGER by \"stated dating\": 3/19/2019  Ultrasound examination on: 2019  GA by U/S based upon: AC, BPD, Femur, HC  GA by U/S 36 w + 0 d  GINGER by U/S: 3/4/2019  Previous dating: Dating performed on 2018, based on the external assessment  Agreed GINGER of previous dating: 3/19/2019  Assigned: Dating performed on 2019, based on the external assessment  Assigned GA 33 w + 6 d  Assigned GINGER: 3/19/2019  Fetal Biometry  ============  Fetal Biometry  BPD 86.9 mm  35w 1d                                  80%  .7 mm  35w 2d                                  72%  .3 mm  36w 2d                                  75%  .0 mm  36w 4d                                  98%  Femur 70.0 mm  35w 6d                                  88%  Humerus 59.6 mm                                              73%  HC / AC 0.99  EFW 2,867 g  36w 1d                                  81%  EFW (lb) 6 lb  EFW (oz) 5 oz  EFW by: Hadlock (BPD-HC-AC-FL)  Head / Face / Neck  Cephalic index 0.76                                              24%  Cav. septi pel. tr 7.0 mm   3.7 mm  CM 5.3 mm                                              5%  Nasal bone 11.1 mm  Extremities / Bony Struc  FL / BPD 0.81  FL / HC 0.22  FL / AC 0.21  Other Structures  MVP 11.0 cm   bpm  CÉSAR 26.6 cm  General Evaluation  ==============  Cardiac activity present.  bpm.  Fetal movements present.  Presentation cephalic.  Placenta posterior, fundal.  Umbilical cord Cord vessels: 3 vessel cord. Cord " insertion: placental insertion: not well seen.  Amniotic fluid Amount of AF: polyhydramnios. MVP 11.0 cm. CÉSAR 26.6 cm. Q1 4.5 cm, Q2 11.0 cm, Q3 2.9 cm, Q4 8.2 cm.  Fetal Anatomy  ===========  Cranium: normal  Cranium: appears normal  Lateral ventricles: appears normal  Cisterna magna: appears normal  Lips: Normal  Lips: Nose, eyes, and profile: appears normal  4-chamber view: normal  4-chamber view: appears normal  Cardiac location and axis: appears normal  Outflow tracts: left and right ventricular outflow tracts appears normal  Cardiac Rhythm: regular at normal rate  Abdom. wall: appears normal  Stomach: appears normal  Kidneys: appears normal  Bladder: appears normal  Gender: male  Gender: normal  Wants to know gender: yes  Impression  =========  Size consistent with dates but at 80% with large AC.  No fetal anomalies were identified.  Amniotic fluid volume is mildly increased.  Umbilical artery S/D ratio is normal.  Recommendation  ==============  Follow-up as clinically indicated.  Recommend delviermonet at 36-37 weeks gestation.  Coding  ======  Description: [id number] Follow Up Ultrasound      Sonographer: Daisy Najera [facility]  Physician: Doug Milligan, MD, FACOG    Electronically signed by: Doug Milligan, MD, FACOG at:  08:11             Physician Progress Notes (last 72 hours) (Notes from 2019  9:10 AM through 2019  9:10 AM)      Ave Juárez MD at 2/3/2019 11:43 AM        HD#3  33+5 with  contractions     Pt reports contractions have slowed dramatically.  AFVSS   FHTs +accels  toco about 1 ctx/ h  cvx was 1 on  (stable from exam at 29 weeks.)\     A/P Pt with h/o classical incision. Pt with non laboring contractions. Will cont mag through rescue dose steroids (last given at 29 wks). Will get PDC consult on Monday AM        Electronically signed by Ave Juárez MD at 2/3/2019 11:44 AM     Ave Juárez MD at 2019  8:11 AM        HD#2  33+4 with  contractions    Pt  reports contractions felt like they stopped a bit but felt a few more this AM. She reports not intense, just present. They usually stop with urination.  AFVSS   FHTs +accels  toco overnight q2-20 minutes  cvx was 1 ast PM (stable from exam at 29 weeks.)\    A/P Pt with h/o classical incision. Pt with non laboring contractions. Will cont mag through rescue dose steroids (last given at 29 wks). Will get PDC consult on Monday AM    Electronically signed by Ave Juárez MD at 2/2/2019  8:14 AM       Consult Notes (last 72 hours) (Notes from 2/1/2019  9:10 AM through 2/4/2019  9:10 AM)     No notes of this type exist for this encounter.           Nursing Assessments (last 72 hours)      OB PCS Body System     Row Name 02/03/19 1956 02/03/19 1645 02/03/19 1343 02/03/19 1141 02/03/19 0752       Pain/Comfort/Sleep    Presence of Pain  denies pain/discomfort  denies pain/discomfort  denies pain/discomfort  denies pain/discomfort  denies pain/discomfort    Preferred Pain Scale  word (verbal rating pain scale)  --  --  --  --    Pain Rating (0-10): Rest  --  --  --  --  0    Pain Rating (0-10): Activity  --  --  --  --  0    Pain Management Interventions  pain management plan reviewed with patient/caregiver;quiet environment facilitated;relaxation techniques promoted  --  --  --  --    Sleep/Rest/Relaxation  --  --  --  --  awake       Coping/Psychosocial    Observed Emotional State  accepting;calm;cooperative;pleasant  accepting;calm;cooperative  --  --  accepting;calm;cooperative    Verbalized Emotional State  acceptance  acceptance  --  --  acceptance    Plan of Care Reviewed With  patient  patient  --  --  patient       Psychosocial Support    Trust Relationship/Rapport  care explained;choices provided;empathic listening provided;questions answered;questions encouraged;reassurance provided;thoughts/feelings acknowledged  care explained;choices provided;questions answered  --  --  care explained;choices  provided;emotional support provided;empathic listening provided;questions answered;questions encouraged;reassurance provided;thoughts/feelings acknowledged    Diversional Activities  --  television  --  --  --       Involvement in Care    Family/Support System, Persons  patient  --  --  --  patient       Coping/Psychosocial Interventions    Supportive Measures  --  --  --  --  active listening utilized       HEENT WDL    HEENT WDL  --  WDL  --  --  WDL    Head Symptoms  --  --  --  --  no swelling;no tenderness    Left Vision Change  --  --  --  --  vision change denied    Right Vision Change  --  --  --  --  vision change denied       Cognitive    Cognitive/Neuro/Behavioral WDL  --  WDL  --  --  WDL    Level of Consciousness  --  --  --  --  Alert       Deep Tendon Reflexes    Left Patellar Reflex  --  --  --  --  2-->average, normal    Right Patellar Reflex  --  --  --  --  2-->average, normal    Left Clonus  --  --  --  --  absent    Right Clonus  --  --  --  --  absent       Behavior WD    Behavior WDL  --  WDL  --  --  WDL       Respiratory    Respiratory WDL  --  WDL  --  --  WDL       Breath Sounds    Breath Sounds  --  --  --  --  All Fields    All Lung Fields Breath Sounds  --  --  --  --  Anterior:;clear;equal bilaterally       Oxygen Therapy    Device (Oxygen Therapy)  room air  --  --  --  --       Cardiac    Cardiac WDL  WDL  WDL  --  --  WDL       Peripheral Neurovascular    Peripheral Neurovascular WDL  WDL  WDL  --  --  WDL    VTE Prevention/Management  bilateral;sequential compression devices on  bilateral;sequential compression devices on  --  --  --       Neurovascular Assessment    Neurovascular Assessment  General  General  --  --  General    All Extremities Temperature  warm  warm  --  --  warm    All Extremities Color  no discoloration  no discoloration  --  --  no discoloration    All Extremities Sensation  no tingling;no numbness  no tingling;no numbness  --  --  no numbness;no tingling        Edema    Edema  dependent;generalized  generalized  --  --  --    Dependent Edema  0 (None Present)  0 (None Present)  --  --  0 (None Present)    Generalized Edema  0 (None Present)  --  --  --  --       Gastrointestinal    GI [facility]  --  --  WD       Genitourinary    Genitourinary [facility]  --  --  WD       Uterine Activity Assessment    Uterine Resting Tone  --  soft by palpation  --  --  soft by palpation       Skin    Skin [facility]  --  --  [facility]       Dimitrios Risk Assessment (If Dimitrios score </= 18, add the appropriate CPG to the care plan)    Sensory Perception  4-->no impairment  --  --  --  4-->no impairment    Moisture  4-->rarely moist  --  --  --  4-->rarely moist    Activity  3-->walks occasionally  --  --  --  3-->walks occasionally    Mobility  4-->no limitation  --  --  --  4-->no limitation    Nutrition  3-->adequate  --  --  --  3-->adequate    Friction and Shear  3-->no apparent problem  --  --  --  3-->no apparent problem    Dimitrios Score  21  --  --  --  21       Musculoskeletal    Musculoskeletal [facility]  --  --  [facility]       Functional Screen (every 3 days/change)    Ambulation  0 - independent  --  --  --  0 - independent    Transferring  0 - independent  --  --  --  0 - independent    Toileting  0 - independent  --  --  --  0 - independent    Bathing  0 - independent  --  --  --  0 - independent    Dressing  0 - independent  --  --  --  0 - independent    Eating  0 - independent  --  --  --  0 - independent    Communication  0 - understands/communicates without difficulty  --  --  --  0 - understands/communicates without difficulty    Swallowing  0 - swallows foods/liquids without difficulty  --  --  --  0 - swallows foods/liquids without difficulty       Nutrition    Diet/Nutrition Received  regular  --  --  --  regular       Peripheral IV 02/01/19 2250 Right Forearm    IV Properties Placement Date: 02/01/19 Placement Time: 2250 Hand Hygiene Completed: Yes Size (Gauge):  18 G Orientation: Right Location: Forearm Site Prep: Alcohol Local Anesthetic: None Technique: Anatomical landmarks Inserted by: palma delgadillo rn Total insertion attempts: 2 Patient Tolerance: Tolerated well    Site Assessment  Clean;Dry;Intact  Clean;Dry;Intact  --  --  Clean;Dry;Intact    Dressing Type  Transparent  Transparent  --  --  Transparent    Line Status  Infusing  Infusing  --  --  Infusing    Dressing Status  Clean;Dry;Intact  Clean;Dry;Intact  --  --  Clean;Dry;Intact    Phlebitis  0-->no symptoms  0-->no symptoms  --  --  0-->no symptoms       Sepsis Screening Tool    Previous screen positive?  no  --  --  --  no    Are 2 or > of the above criteria present?  no: STOP/negative screen  --  --  --  no: STOP/negative screen       Safety    Safety WDL  WDL  WDL  WDL  --  WDL    Safety Factors  --  upper side rails raised x 2, lower side rail raised x 1;call light in reach;bed in low position  patient up in chair;wheels locked;call light in reach  --  upper side rails raised x 2, lower side rail raised x 1    All Alarms  --  none present  none present  --  none present    Infection Prevention  single patient room provided  personal protective equipment utilized  --  --  personal protective equipment utilized    Falls Risk Assessment: Patient Age  [facility] High Risk Falls Assessment  --  --  --  --       [facility] High Risk Falls Assessment (If Fall score is >/=13, add the Fall Risk CPG to the care plan)     Fallen in past 6 months  0--> No  --  --  --  0--> No    Mental Status  0--> no mental status change  --  --  --  0--> no mental status change    Elimination  2--> Frequent toileting  --  --  --  2--> Frequent toileting    Mobility  0--> No mobility issues  --  --  --  0--> No mobility issues    Medications  1--> Sedatives  --  --  --  0--> No meds    Nurses' Clinical Judgement  3  --  --  --  4    Total Fall Risk Score  6  --  --  --  6       Safety Management    Safety Promotion/Fall Prevention   safety round/check completed  safety round/check completed  safety round/check completed  --  safety round/check completed    Medication Review/Management  medications reviewed  --  --  --  --    Environmental Safety Modification  assistive device/personal items within reach;clutter free environment maintained;room near unit station;room organization consistent  assistive device/personal items within reach;clutter free environment maintained  --  --  --       Safety Interventions    Infection Management  --  --  --  --  aseptic technique maintained       Daily Care    Activity Management  activity adjusted per tolerance  activity adjusted per tolerance  --  --  activity adjusted per tolerance    Activity Assistance Provided  independent  independent  --  --  --       Positioning    Body Position  supine  sitting up in bed  --  --  sitting up in bed    Head of Bed (HOB)  HOB at 60-90 degrees  HOB at 45 degrees  --  --  HOB at 60-90 degrees    Positioning/Transfer Devices  pillows;in use  --  --  --  --    Row Name 02/02/19 1950 02/02/19 1700 02/02/19 1500 02/02/19 1400 02/02/19 1300       Pain/Comfort/Sleep    Presence of Pain  denies pain/discomfort  --  --  denies pain/discomfort  denies pain/discomfort    Preferred Pain Scale  word (verbal rating pain scale)  --  --  number (Numeric Rating Pain Scale)  number (Numeric Rating Pain Scale)    Sleep/Rest/Relaxation  --  --  --  awake  awake       Coping/Psychosocial    Observed Emotional State  accepting;calm;cooperative;pleasant  --  --  calm;cooperative;pleasant  calm;cooperative;pleasant    Verbalized Emotional State  acceptance  --  --  acceptance  acceptance    Plan of Care Reviewed With  patient  --  --  patient  patient       Psychosocial Support    Trust Relationship/Rapport  care explained;choices provided;emotional support provided;empathic listening provided;questions answered;questions encouraged;reassurance provided;thoughts/feelings acknowledged  --  --   care explained;choices provided;emotional support provided;empathic listening provided;questions answered;questions encouraged;reassurance provided;thoughts/feelings acknowledged  care explained;choices provided;emotional support provided;empathic listening provided;questions answered;reassurance provided;questions encouraged;thoughts/feelings acknowledged    Diversional Activities  --  --  --  smartphone  smartphone       Involvement in Care    Family/Support System, Persons  --  --  --  spouse  spouse    Involvement in Care  --  --  --  not present at bedside  not present at bedside       HEENT [facility]    HEENT [facility]  --  --  --  --       Cognitive    Cognitive/Neuro/Behavioral [facility]  --  --  --  --       Behavior [facility]    Behavior [facility]  --  --  --  --       Respiratory    Respiratory [facility]  --  --  --  --       Oxygen Therapy    Device (Oxygen Therapy)  room air  --  --  --  --       Cardiac    Cardiac [facility]  --  --  --  --       Peripheral Neurovascular    Peripheral Neurovascular [facility]  --  --  --  --    VTE Prevention/Management  bilateral;sequential compression devices on  --  --  --  --       Neurovascular Assessment    Neurovascular Assessment  General  --  --  --  --    All Extremities Temperature  warm  --  --  --  --    All Extremities Color  no discoloration  --  --  --  --    All Extremities Sensation  no numbness;no tingling  --  --  --  --       Edema    Edema  dependent;generalized  --  --  --  --    Dependent Edema  0 (None Present)  --  --  --  --    Generalized Edema  0 (None Present)  --  --  --  --       Gastrointestinal    GI [facility]  --  --  --  --       Genitourinary    Genitourinary [facility]  --  --  --  --       Reproductive Interventions    Fetal Wellbeing Promotion  fetal heart rate monitored;intake and output monitored;uterine contraction activity assessed  --  --  --  --       Skin    Skin [facility]  --  --  --  --       Dimitrios Risk Assessment (If Dimitrios score </= 18, add  the appropriate CPG to the care plan)    Sensory Perception  4-->no impairment  --  --  --  --    Moisture  4-->rarely moist  --  --  --  --    Activity  4-->walks frequently  --  --  --  --    Mobility  4-->no limitation  --  --  --  --    Nutrition  3-->adequate  --  --  --  --    Friction and Shear  3-->no apparent problem  --  --  --  --    Dimitrios Score  22  --  --  --  --       Musculoskeletal    Musculoskeletal WDL  WDL  --  --  --  --       Functional Screen (every 3 days/change)    Ambulation  0 - independent  --  --  --  --    Transferring  0 - independent  --  --  --  --    Toileting  0 - independent  --  --  --  --    Bathing  0 - independent  --  --  --  --    Dressing  0 - independent  --  --  --  --    Eating  0 - independent  --  --  --  --    Communication  0 - understands/communicates without difficulty  --  --  --  --    Swallowing  0 - swallows foods/liquids without difficulty  --  --  --  --       Nutrition    Diet/Nutrition Received  regular  --  --  --  --       Peripheral IV 02/01/19 2250 Right Forearm    IV Properties Placement Date: 02/01/19 Placement Time: 2250 Hand Hygiene Completed: Yes Size (Gauge): 18 G Orientation: Right Location: Forearm Site Prep: Alcohol Local Anesthetic: None Technique: Anatomical landmarks Inserted by: palma delgadillo rn Total insertion attempts: 2 Patient Tolerance: Tolerated well    Site Assessment  Clean;Dry;Intact  --  --  --  --    Dressing Type  Transparent  --  --  --  --    Line Status  Infusing  --  --  --  --    Dressing Status  Clean;Dry;Intact  --  --  --  --    Phlebitis  0-->no symptoms  --  --  --  --       Sepsis Screening Tool    Previous screen positive?  no  --  --  --  --    Are 2 or > of the above criteria present?  no: STOP/negative screen  --  --  --  --       Safety    Safety WDL  WDL  --  --  WDL  WDL    All Alarms  --  --  --  none present  none present    Infection Prevention  single patient room provided  --  --  --  --    Falls Risk  Assessment: Patient Age  [facility] High Risk Falls Assessment  --  --  --  --       [facility] High Risk Falls Assessment (If Fall score is >/=13, add the Fall Risk CPG to the care plan)     Fallen in past 6 months  0--> No  --  --  --  --    Mental Status  0--> no mental status change  --  --  --  --    Elimination  2--> Frequent toileting  --  --  --  --    Mobility  0--> No mobility issues  --  --  --  --    Medications  1--> Sedatives  --  --  --  --    Nurses' Clinical Judgement  2  --  --  --  --    Total Fall Risk Score  5  --  --  --  --       Safety Management    Safety Promotion/Fall Prevention  safety round/check completed  safety round/check completed  safety round/check completed  safety round/check completed  safety round/check completed    Medication Review/Management  medications reviewed;high risk medications identified  --  --  --  --    Environmental Safety Modification  assistive device/personal items within reach;clutter free environment maintained;room near unit station;room organization consistent  --  --  assistive device/personal items within reach;clutter free environment maintained;room near unit station;room organization consistent  assistive device/personal items within reach;clutter free environment maintained;room near unit station;room organization consistent       Daily Care    Activity Management  --  --  --  activity adjusted per tolerance  activity adjusted per tolerance    Activity Assistance Provided  --  --  --  independent  independent       Positioning    Body Position  supine  --  --  --  --    Head of Bed (HOB)  HOB at 30 degrees  --  --  --  --    Positioning/Transfer Devices  pillows;in use  --  --  --  --    Row Name 02/02/19 1248 02/02/19 1200 02/02/19 1100 02/02/19 1000 02/02/19 0900       Pain/Comfort/Sleep    Presence of Pain  --  denies pain/discomfort  denies pain/discomfort  denies pain/discomfort  denies pain/discomfort    Preferred Pain Scale  --   number (Numeric Rating Pain Scale)  number (Numeric Rating Pain Scale)  number (Numeric Rating Pain Scale)  number (Numeric Rating Pain Scale)    Sleep/Rest/Relaxation  sleeping between care  awake  sleeping between care  awake  awake       Coping/Psychosocial    Observed Emotional State  --  calm;cooperative;pleasant  calm;cooperative;pleasant  calm;cooperative;pleasant  calm;cooperative;pleasant    Verbalized Emotional State  --  acceptance  acceptance  acceptance  acceptance    Plan of Care Reviewed With  --  patient  patient  patient;spouse  --       Psychosocial Support    Trust Relationship/Rapport  --  care explained;choices provided;emotional support provided;empathic listening provided;questions answered;questions encouraged;reassurance provided;thoughts/feelings acknowledged  care explained;choices provided;emotional support provided;empathic listening provided;questions answered;questions encouraged;reassurance provided;thoughts/feelings acknowledged  care explained;choices provided;emotional support provided;empathic listening provided;questions answered;questions encouraged;reassurance provided;thoughts/feelings acknowledged  --    Diversional Activities  --  smartphone;television  television  smartphone;television  --    Family/Support System Care  --  --  --  presence promoted;support provided  --       Involvement in Care    Family/Support System, Persons  --  spouse  spouse  spouse  spouse    Involvement in Care  --  not present at bedside  not present at bedside  at bedside;attentive to patient;interacting with patient;participating in care  at bedside;attentive to patient;interacting with patient;participating in care       Peripheral IV 02/01/19 2250 Right Forearm    IV Properties Placement Date: 02/01/19 Placement Time: 2250 Hand Hygiene Completed: Yes Size (Gauge): 18 G Orientation: Right Location: Forearm Site Prep: Alcohol Local Anesthetic: None Technique: Anatomical landmarks Inserted by: palma  elie charles Total insertion attempts: 2 Patient Tolerance: Tolerated well       Safety    Safety WDL  WDL  WDL  WDL  WDL  WDL    All Alarms  --  none present  none present  none present  none present       Safety Management    Safety Promotion/Fall Prevention  safety round/check completed  safety round/check completed  safety round/check completed  safety round/check completed  safety round/check completed    Environmental Safety Modification  --  assistive device/personal items within reach;clutter free environment maintained;room near unit station;room organization consistent  assistive device/personal items within reach;clutter free environment maintained;room near unit station;room organization consistent  assistive device/personal items within reach;clutter free environment maintained;room near unit station;room organization consistent  assistive device/personal items within reach;clutter free environment maintained;room near unit station;room organization consistent       Daily Care    Activity Management  --  activity adjusted per tolerance  activity adjusted per tolerance  activity adjusted per tolerance  activity adjusted per tolerance    Activity Assistance Provided  --  independent  independent  independent  independent    Row Name 02/02/19 0800 02/02/19 0710 02/01/19 1789             Pain/Comfort/Sleep    Presence of Pain  denies pain/discomfort  denies pain/discomfort  complains of pain/discomfort contractions only      Preferred Pain Scale  number (Numeric Rating Pain Scale)  number (Numeric Rating Pain Scale)  number (Numeric Rating Pain Scale)      Pain Body Location - Side  --  --  Bilateral      Pain Body Location - Orientation  --  --  lower      Pain Body Location  --  --  abdomen      Pain Rating (0-10): Rest  --  --  5 3-6 range      Pain Radiation to  --  --  back      Frequency  --  --  intermittent      Sleep/Rest/Relaxation  awake  awake  --         Coping/Psychosocial    Observed Emotional  State  calm;cooperative;pleasant  calm;cooperative;pleasant  calm;cooperative;pleasant      Verbalized Emotional State  acceptance  acceptance  --      Plan of Care Reviewed With  patient;spouse  patient;spouse  --         Psychosocial Support    Trust Relationship/Rapport  care explained;choices provided;emotional support provided;empathic listening provided;questions answered;questions encouraged;reassurance provided;thoughts/feelings acknowledged  care explained;choices provided;emotional support provided;empathic listening provided;questions answered;questions encouraged;reassurance provided;thoughts/feelings acknowledged  --      Diversional Activities  smartphone;television  smartphone;television  --      Family/Support System Care  presence promoted;support provided  presence promoted;support provided  --         Involvement in Care    Family/Support System, Persons  spouse  spouse  --      Involvement in Care  at bedside;attentive to patient;interacting with patient;participating in care  at bedside;attentive to patient;interacting with patient;participating in care  --         HEENT WDL    HEENT WDL  --  WDL  WDL      Head Symptoms  --  no swelling;no tenderness  --      Left Vision Change  --  vision change denied  --      Right Vision Change  --  vision change denied  --         Cognitive    Cognitive/Neuro/Behavioral WDL  --  WDL  --      Level of Consciousness  --  Alert  --         Deep Tendon Reflexes    Left Patellar Reflex  --  2-->average, normal  --      Right Patellar Reflex  --  2-->average, normal  --      Left Clonus  --  absent  --      Right Clonus  --  absent  --         Respiratory    Respiratory WDL  --  WDL  WDL      Cough And Deep Breathing  --  done independently per patient  --         Breath Sounds    Breath Sounds  --  All Fields  --      All Lung Fields Breath Sounds  --  clear;equal bilaterally  --         Oxygen Therapy    Device (Oxygen Therapy)  --  room air  --         Cardiac     Cardiac WDL  --  WDL  WDL         Peripheral Neurovascular    Peripheral Neurovascular WDL  --  WDL  --         Neurovascular Assessment    Neurovascular Assessment  --  General  --      All Extremities Temperature  --  warm  --      All Extremities Color  --  no discoloration  --      All Extremities Sensation  --  no numbness;no tingling  --         Edema    Dependent Edema  --  --  0 (None Present)         Gastrointestinal    GI WDL  --  WDL  WDL      Abdominal Appearance  --  pregnant  --         Genitourinary    Genitourinary WDL  --  WDL  WDL         Skin    Skin WDL  --  WDL  WDL         Dimitrios Risk Assessment (If Dimitrios score </= 18, add the appropriate CPG to the care plan)    Sensory Perception  --  4-->no impairment  4-->no impairment      Moisture  --  4-->rarely moist  4-->rarely moist      Activity  --  4-->walks frequently  4-->walks frequently      Mobility  --  4-->no limitation  4-->no limitation      Nutrition  --  4-->excellent  4-->excellent      Friction and Shear  --  3-->no apparent problem  3-->no apparent problem      Dimitrios Score  --  23  23         Musculoskeletal    Musculoskeletal WDL  --  WDL  WDL         Functional Screen (every 3 days/change)    Ambulation  --  0 - independent  --      Transferring  --  0 - independent  --      Toileting  --  0 - independent  --      Bathing  --  0 - independent  --      Dressing  --  0 - independent  --      Eating  --  0 - independent  --      Communication  --  0 - understands/communicates without difficulty  --      Swallowing  --  0 - swallows foods/liquids without difficulty  --         Nutrition    Diet/Nutrition Received  --  regular  --         Peripheral IV 02/01/19 2250 Right Forearm    IV Properties Placement Date: 02/01/19 Placement Time: 2250 Hand Hygiene Completed: Yes Size (Gauge): 18 G Orientation: Right Location: Forearm Site Prep: Alcohol Local Anesthetic: None Technique: Anatomical landmarks Inserted by: palma delgadillo rn Total  insertion attempts: 2 Patient Tolerance: Tolerated well    Site Assessment  --  Clean;Dry;Intact  --      Dressing Type  --  Transparent  --      Line Status  --  Infusing  --      Dressing Status  --  Clean;Dry;Intact  --      Reason Not Rotated  --  Not due  --      Phlebitis  --  0-->no symptoms  --         Sepsis Screening Tool    Previous screen positive?  --  no  --      Are 2 or > of the above criteria present?  --  no: STOP/negative screen  --         Safety    Safety WDL  WDL  WDL  WDL      Safety Factors  --  --  ID band on;upper side rails raised x 2;call light in reach;wheels locked;bed in low position      All Alarms  none present  none present  --      Falls Risk Assessment: Patient Age  --  --  [facility] High Risk Falls Assessment         [facility] High Risk Falls Assessment (If Fall score is >/=13, add the Fall Risk CPG to the care plan)     Fallen in past 6 months  --  0--> No  0--> No      Mental Status  --  0--> no mental status change  0--> no mental status change      Elimination  --  0--> No elimination issues  0--> No elimination issues      Mobility  --  0--> No mobility issues  0--> No mobility issues      Medications  --  0--> No meds  0--> No meds      Nurses' Clinical Judgement  --  3  3      Total Fall Risk Score  --  3  3         Safety Management    Safety Promotion/Fall Prevention  safety round/check completed  safety round/check completed  nonskid shoes/slippers when out of bed;safety round/check completed      Environmental Safety Modification  assistive device/personal items within reach;clutter free environment maintained;room organization consistent  assistive device/personal items within reach;clutter free environment maintained;room near unit station;room organization consistent  --         Daily Care    Activity Management  activity adjusted per tolerance  activity adjusted per tolerance  --      Activity Assistance Provided  independent  independent  --          Positioning    Body Position  --  --  sitting up in bed      Head of Bed (HOB)  --  --  HOB at 60-90 degrees      Positioning/Transfer Devices  --  --  pillows

## 2019-02-04 NOTE — POST-PROCEDURE NOTE
MFM Ultrasound    Size=Dates = 6lb 5oz (81% with large AC)  CÉSAR increased at 26 cm  S/D normal  BPP 8/8    Probably OK for D/C home if contractions remain quiet.    Recommend delivery at 36-37 weeks gestation.

## 2019-02-14 ENCOUNTER — TRANSCRIBE ORDERS (OUTPATIENT)
Dept: LAB | Facility: HOSPITAL | Age: 29
End: 2019-02-14

## 2019-02-14 ENCOUNTER — LAB (OUTPATIENT)
Dept: LAB | Facility: HOSPITAL | Age: 29
End: 2019-02-14

## 2019-02-14 DIAGNOSIS — Z34.83 PRENATAL CARE, SUBSEQUENT PREGNANCY, THIRD TRIMESTER: Primary | ICD-10-CM

## 2019-02-14 DIAGNOSIS — Z34.83 PRENATAL CARE, SUBSEQUENT PREGNANCY, THIRD TRIMESTER: ICD-10-CM

## 2019-02-14 PROCEDURE — 87081 CULTURE SCREEN ONLY: CPT

## 2019-02-17 ENCOUNTER — HOSPITAL ENCOUNTER (INPATIENT)
Facility: HOSPITAL | Age: 29
LOS: 3 days | Discharge: HOME | End: 2019-02-20
Attending: OBSTETRICS & GYNECOLOGY | Admitting: OBSTETRICS & GYNECOLOGY

## 2019-02-17 ENCOUNTER — ANESTHESIA EVENT (OUTPATIENT)
Dept: LABOR AND DELIVERY | Facility: HOSPITAL | Age: 29
End: 2019-02-17

## 2019-02-17 ENCOUNTER — ANESTHESIA (OUTPATIENT)
Dept: LABOR AND DELIVERY | Facility: HOSPITAL | Age: 29
End: 2019-02-17

## 2019-02-17 PROBLEM — Z34.90 PREGNANCY: Status: ACTIVE | Noted: 2019-02-17

## 2019-02-17 LAB
ABO GROUP BLD: NORMAL
ATMOSPHERIC PRESS: ABNORMAL MMHG
ATMOSPHERIC PRESS: ABNORMAL MMHG
BACTERIA SPEC AEROBE CULT: NORMAL
BASE EXCESS BLDCOA CALC-SCNC: -0.2 MMOL/L (ref 0–2)
BASE EXCESS BLDCOV CALC-SCNC: 0.9 MMOL/L (ref 0–2)
BDY SITE: ABNORMAL
BDY SITE: ABNORMAL
BLD GP AB SCN SERPL QL: NEGATIVE
BODY TEMPERATURE: 37 C
BODY TEMPERATURE: 37 C
CO2 BLDA-SCNC: 28.6 MMOL/L (ref 23–27)
CO2 BLDA-SCNC: 28.7 MMOL/L (ref 23–27)
DEPRECATED RDW RBC AUTO: 48.2 FL (ref 37–54)
ERYTHROCYTE [DISTWIDTH] IN BLOOD BY AUTOMATED COUNT: 13.5 % (ref 11.3–14.5)
HCO3 BLDCOA-SCNC: 27 MMOL/L (ref 16.9–20.5)
HCO3 BLDCOV-SCNC: 27.2 MMOL/L (ref 18.6–21.4)
HCT VFR BLD AUTO: 37.9 % (ref 34.5–44)
HGB BLD-MCNC: 12.6 G/DL (ref 11.5–15.5)
HGB BLDA-MCNC: 13.3 G/DL (ref 14–18)
HGB BLDA-MCNC: 14.1 G/DL (ref 14–18)
HOROWITZ INDEX BLD+IHG-RTO: 21 %
HOROWITZ INDEX BLD+IHG-RTO: 21 %
MCH RBC QN AUTO: 32.8 PG (ref 27–31)
MCHC RBC AUTO-ENTMCNC: 33.2 G/DL (ref 32–36)
MCV RBC AUTO: 98.7 FL (ref 80–99)
MODALITY: ABNORMAL
MODALITY: ABNORMAL
NOTE: ABNORMAL
NOTE: ABNORMAL
PCO2 BLDCOA: 53.3 MMHG (ref 43.3–54.9)
PCO2 BLDCOV: 49.1 MM HG
PH BLDCOA: 7.31 PH UNITS (ref 7.22–7.3)
PH BLDCOV: 7.35 PH UNITS
PLATELET # BLD AUTO: 192 10*3/MM3 (ref 150–450)
PMV BLD AUTO: 9.9 FL (ref 6–12)
PO2 BLDCOA: 11 MMHG (ref 11.5–43.3)
PO2 BLDCOV: 14.2 MM HG
RBC # BLD AUTO: 3.84 10*6/MM3 (ref 3.89–5.14)
RH BLD: POSITIVE
SAO2 % BLDCOA: 17 %
SAO2 % BLDCOA: ABNORMAL % (ref 92–98)
SAO2 % BLDCOV: 22.4 %
T&S EXPIRATION DATE: NORMAL
WBC NRBC COR # BLD: 10.97 10*3/MM3 (ref 3.5–10.8)

## 2019-02-17 PROCEDURE — 86901 BLOOD TYPING SEROLOGIC RH(D): CPT | Performed by: OBSTETRICS & GYNECOLOGY

## 2019-02-17 PROCEDURE — 85027 COMPLETE CBC AUTOMATED: CPT | Performed by: OBSTETRICS & GYNECOLOGY

## 2019-02-17 PROCEDURE — 25010000003 CEFAZOLIN IN DEXTROSE 2-4 GM/100ML-% SOLUTION: Performed by: OBSTETRICS & GYNECOLOGY

## 2019-02-17 PROCEDURE — 86850 RBC ANTIBODY SCREEN: CPT | Performed by: OBSTETRICS & GYNECOLOGY

## 2019-02-17 PROCEDURE — 25010000002 METOCLOPRAMIDE PER 10 MG: Performed by: ANESTHESIOLOGY

## 2019-02-17 PROCEDURE — 25010000002 TERBUTALINE PER 1 MG: Performed by: OBSTETRICS & GYNECOLOGY

## 2019-02-17 PROCEDURE — 25010000002 ONDANSETRON PER 1 MG: Performed by: ANESTHESIOLOGY

## 2019-02-17 PROCEDURE — 25010000002 MIDAZOLAM PER 1 MG: Performed by: ANESTHESIOLOGY

## 2019-02-17 PROCEDURE — 25010000003 MORPHINE PER 10 MG: Performed by: ANESTHESIOLOGY

## 2019-02-17 PROCEDURE — 86900 BLOOD TYPING SEROLOGIC ABO: CPT | Performed by: OBSTETRICS & GYNECOLOGY

## 2019-02-17 PROCEDURE — 88307 TISSUE EXAM BY PATHOLOGIST: CPT | Performed by: OBSTETRICS & GYNECOLOGY

## 2019-02-17 PROCEDURE — 82805 BLOOD GASES W/O2 SATURATION: CPT

## 2019-02-17 PROCEDURE — C1765 ADHESION BARRIER: HCPCS | Performed by: OBSTETRICS & GYNECOLOGY

## 2019-02-17 PROCEDURE — 25010000002 FENTANYL CITRATE (PF) 100 MCG/2ML SOLUTION: Performed by: ANESTHESIOLOGY

## 2019-02-17 PROCEDURE — 59025 FETAL NON-STRESS TEST: CPT

## 2019-02-17 RX ORDER — ONDANSETRON 2 MG/ML
INJECTION INTRAMUSCULAR; INTRAVENOUS AS NEEDED
Status: DISCONTINUED | OUTPATIENT
Start: 2019-02-17 | End: 2019-02-17 | Stop reason: SURG

## 2019-02-17 RX ORDER — SODIUM CHLORIDE 0.9 % (FLUSH) 0.9 %
3 SYRINGE (ML) INJECTION EVERY 12 HOURS SCHEDULED
Status: DISCONTINUED | OUTPATIENT
Start: 2019-02-17 | End: 2019-02-17 | Stop reason: HOSPADM

## 2019-02-17 RX ORDER — TERBUTALINE SULFATE 1 MG/ML
0.25 INJECTION, SOLUTION SUBCUTANEOUS ONCE
Status: COMPLETED | OUTPATIENT
Start: 2019-02-17 | End: 2019-02-17

## 2019-02-17 RX ORDER — MIDAZOLAM HYDROCHLORIDE 1 MG/ML
INJECTION INTRAMUSCULAR; INTRAVENOUS AS NEEDED
Status: DISCONTINUED | OUTPATIENT
Start: 2019-02-17 | End: 2019-02-17 | Stop reason: SURG

## 2019-02-17 RX ORDER — MORPHINE SULFATE 0.5 MG/ML
INJECTION, SOLUTION EPIDURAL; INTRATHECAL; INTRAVENOUS
Status: COMPLETED | OUTPATIENT
Start: 2019-02-17 | End: 2019-02-17

## 2019-02-17 RX ORDER — SODIUM CHLORIDE 0.9 % (FLUSH) 0.9 %
1-10 SYRINGE (ML) INJECTION AS NEEDED
Status: DISCONTINUED | OUTPATIENT
Start: 2019-02-17 | End: 2019-02-17 | Stop reason: HOSPADM

## 2019-02-17 RX ORDER — OXYTOCIN-SODIUM CHLORIDE 0.9% IV SOLN 30 UNIT/500ML 30-0.9/5 UT/ML-%
650 SOLUTION INTRAVENOUS ONCE
Status: CANCELLED | OUTPATIENT
Start: 2019-02-17 | End: 2019-02-17

## 2019-02-17 RX ORDER — FENTANYL CITRATE 50 UG/ML
INJECTION, SOLUTION INTRAMUSCULAR; INTRAVENOUS
Status: COMPLETED | OUTPATIENT
Start: 2019-02-17 | End: 2019-02-17

## 2019-02-17 RX ORDER — LIDOCAINE HYDROCHLORIDE 10 MG/ML
5 INJECTION, SOLUTION EPIDURAL; INFILTRATION; INTRACAUDAL; PERINEURAL AS NEEDED
Status: DISCONTINUED | OUTPATIENT
Start: 2019-02-17 | End: 2019-02-17 | Stop reason: HOSPADM

## 2019-02-17 RX ORDER — FAMOTIDINE 10 MG/ML
INJECTION, SOLUTION INTRAVENOUS AS NEEDED
Status: DISCONTINUED | OUTPATIENT
Start: 2019-02-17 | End: 2019-02-17 | Stop reason: SURG

## 2019-02-17 RX ORDER — METHYLERGONOVINE MALEATE 0.2 MG/ML
200 INJECTION INTRAVENOUS ONCE AS NEEDED
Status: CANCELLED | OUTPATIENT
Start: 2019-02-17

## 2019-02-17 RX ORDER — OXYCODONE HYDROCHLORIDE AND ACETAMINOPHEN 5; 325 MG/1; MG/1
1 TABLET ORAL EVERY 4 HOURS PRN
Status: DISCONTINUED | OUTPATIENT
Start: 2019-02-17 | End: 2019-02-20 | Stop reason: HOSPADM

## 2019-02-17 RX ORDER — TRISODIUM CITRATE DIHYDRATE AND CITRIC ACID MONOHYDRATE 500; 334 MG/5ML; MG/5ML
30 SOLUTION ORAL ONCE
Status: COMPLETED | OUTPATIENT
Start: 2019-02-17 | End: 2019-02-17

## 2019-02-17 RX ORDER — METOCLOPRAMIDE HYDROCHLORIDE 5 MG/ML
INJECTION INTRAMUSCULAR; INTRAVENOUS AS NEEDED
Status: DISCONTINUED | OUTPATIENT
Start: 2019-02-17 | End: 2019-02-17 | Stop reason: SURG

## 2019-02-17 RX ORDER — CARBOPROST TROMETHAMINE 250 UG/ML
250 INJECTION, SOLUTION INTRAMUSCULAR AS NEEDED
Status: CANCELLED | OUTPATIENT
Start: 2019-02-17

## 2019-02-17 RX ORDER — CEFAZOLIN SODIUM 2 G/100ML
2 INJECTION, SOLUTION INTRAVENOUS ONCE
Status: COMPLETED | OUTPATIENT
Start: 2019-02-17 | End: 2019-02-17

## 2019-02-17 RX ORDER — SODIUM CHLORIDE, SODIUM LACTATE, POTASSIUM CHLORIDE, CALCIUM CHLORIDE 600; 310; 30; 20 MG/100ML; MG/100ML; MG/100ML; MG/100ML
125 INJECTION, SOLUTION INTRAVENOUS CONTINUOUS
Status: DISCONTINUED | OUTPATIENT
Start: 2019-02-17 | End: 2019-02-20 | Stop reason: HOSPADM

## 2019-02-17 RX ORDER — OXYTOCIN-SODIUM CHLORIDE 0.9% IV SOLN 30 UNIT/500ML 30-0.9/5 UT/ML-%
85 SOLUTION INTRAVENOUS ONCE
Status: CANCELLED | OUTPATIENT
Start: 2019-02-17 | End: 2019-02-17

## 2019-02-17 RX ORDER — BUPIVACAINE HYDROCHLORIDE 7.5 MG/ML
INJECTION, SOLUTION EPIDURAL; RETROBULBAR
Status: COMPLETED | OUTPATIENT
Start: 2019-02-17 | End: 2019-02-17

## 2019-02-17 RX ORDER — MISOPROSTOL 200 UG/1
800 TABLET ORAL AS NEEDED
Status: CANCELLED | OUTPATIENT
Start: 2019-02-17

## 2019-02-17 RX ORDER — OXYTOCIN 10 [USP'U]/ML
INJECTION, SOLUTION INTRAMUSCULAR; INTRAVENOUS AS NEEDED
Status: DISCONTINUED | OUTPATIENT
Start: 2019-02-17 | End: 2019-02-17 | Stop reason: SURG

## 2019-02-17 RX ADMIN — TERBUTALINE SULFATE 0.25 MG: 1 INJECTION, SOLUTION SUBCUTANEOUS at 18:02

## 2019-02-17 RX ADMIN — OXYTOCIN 20 UNITS: 10 INJECTION, SOLUTION INTRAMUSCULAR; INTRAVENOUS at 19:55

## 2019-02-17 RX ADMIN — FAMOTIDINE 20 MG: 10 INJECTION, SOLUTION INTRAVENOUS at 19:43

## 2019-02-17 RX ADMIN — FENTANYL CITRATE 20 MCG: 50 INJECTION, SOLUTION INTRAMUSCULAR; INTRAVENOUS at 19:40

## 2019-02-17 RX ADMIN — SODIUM CHLORIDE, POTASSIUM CHLORIDE, SODIUM LACTATE AND CALCIUM CHLORIDE 125 ML/HR: 600; 310; 30; 20 INJECTION, SOLUTION INTRAVENOUS at 18:03

## 2019-02-17 RX ADMIN — MIDAZOLAM HYDROCHLORIDE 1.5 MG: 1 INJECTION, SOLUTION INTRAMUSCULAR; INTRAVENOUS at 20:05

## 2019-02-17 RX ADMIN — SODIUM CHLORIDE, POTASSIUM CHLORIDE, SODIUM LACTATE AND CALCIUM CHLORIDE: 600; 310; 30; 20 INJECTION, SOLUTION INTRAVENOUS at 19:55

## 2019-02-17 RX ADMIN — BUPIVACAINE HYDROCHLORIDE 1.6 ML: 7.5 INJECTION, SOLUTION EPIDURAL; RETROBULBAR at 19:40

## 2019-02-17 RX ADMIN — CEFAZOLIN SODIUM 2 G: 2 INJECTION, SOLUTION INTRAVENOUS at 19:10

## 2019-02-17 RX ADMIN — SODIUM CHLORIDE, POTASSIUM CHLORIDE, SODIUM LACTATE AND CALCIUM CHLORIDE 1000 ML/HR: 600; 310; 30; 20 INJECTION, SOLUTION INTRAVENOUS at 17:32

## 2019-02-17 RX ADMIN — ONDANSETRON 4 MG: 2 INJECTION INTRAMUSCULAR; INTRAVENOUS at 19:43

## 2019-02-17 RX ADMIN — SODIUM CITRATE AND CITRIC ACID MONOHYDRATE 30 ML: 500; 334 SOLUTION ORAL at 19:10

## 2019-02-17 RX ADMIN — OXYTOCIN 10 UNITS: 10 INJECTION, SOLUTION INTRAMUSCULAR; INTRAVENOUS at 19:54

## 2019-02-17 RX ADMIN — SODIUM CHLORIDE, POTASSIUM CHLORIDE, SODIUM LACTATE AND CALCIUM CHLORIDE 1000 ML: 600; 310; 30; 20 INJECTION, SOLUTION INTRAVENOUS at 19:18

## 2019-02-17 RX ADMIN — OXYCODONE AND ACETAMINOPHEN 1 TABLET: 5; 325 TABLET ORAL at 21:39

## 2019-02-17 RX ADMIN — MORPHINE SULFATE 0.2 MG: 0.5 INJECTION, SOLUTION EPIDURAL; INTRATHECAL; INTRAVENOUS at 19:40

## 2019-02-17 RX ADMIN — OXYTOCIN 20 UNITS: 10 INJECTION, SOLUTION INTRAMUSCULAR; INTRAVENOUS at 20:15

## 2019-02-17 RX ADMIN — METOCLOPRAMIDE 10 MG: 5 INJECTION, SOLUTION INTRAMUSCULAR; INTRAVENOUS at 19:43

## 2019-02-17 NOTE — ANESTHESIA PREPROCEDURE EVALUATION
Anesthesia Evaluation     Patient summary reviewed and Nursing notes reviewed                Airway   Mallampati: I  TM distance: >3 FB  Neck ROM: full  No difficulty expected  Dental - normal exam     Pulmonary - negative pulmonary ROS and normal exam   Cardiovascular - negative cardio ROS and normal exam        Neuro/Psych- negative ROS  GI/Hepatic/Renal/Endo - negative ROS     Musculoskeletal (-) negative ROS    Abdominal  - normal exam    Bowel sounds: normal.   Substance History - negative use     OB/GYN negative ob/gyn ROS         Other                        Anesthesia Plan    ASA 2 - emergent     spinal     Anesthetic plan, all risks, benefits, and alternatives have been provided, discussed and informed consent has been obtained with: patient.  Use of blood products discussed with patient .   Plan discussed with attending.

## 2019-02-18 PROCEDURE — 63710000001 DIPHENHYDRAMINE PER 50 MG: Performed by: NURSE PRACTITIONER

## 2019-02-18 RX ORDER — DOCUSATE SODIUM 100 MG/1
100 CAPSULE, LIQUID FILLED ORAL 2 TIMES DAILY PRN
Status: DISCONTINUED | OUTPATIENT
Start: 2019-02-18 | End: 2019-02-20 | Stop reason: HOSPADM

## 2019-02-18 RX ORDER — DIAPER,BRIEF,INFANT-TODD,DISP
EACH MISCELLANEOUS EVERY 12 HOURS SCHEDULED
Status: DISCONTINUED | OUTPATIENT
Start: 2019-02-18 | End: 2019-02-20 | Stop reason: HOSPADM

## 2019-02-18 RX ORDER — DIPHENHYDRAMINE HCL 25 MG
25 CAPSULE ORAL EVERY 6 HOURS PRN
Status: DISCONTINUED | OUTPATIENT
Start: 2019-02-18 | End: 2019-02-20 | Stop reason: HOSPADM

## 2019-02-18 RX ORDER — NALOXONE HCL 0.4 MG/ML
0.4 VIAL (ML) INJECTION
Status: ACTIVE | OUTPATIENT
Start: 2019-02-18 | End: 2019-02-19

## 2019-02-18 RX ORDER — ONDANSETRON 4 MG/1
4 TABLET, FILM COATED ORAL EVERY 8 HOURS PRN
Status: DISCONTINUED | OUTPATIENT
Start: 2019-02-18 | End: 2019-02-20 | Stop reason: HOSPADM

## 2019-02-18 RX ORDER — IBUPROFEN 600 MG/1
600 TABLET ORAL EVERY 6 HOURS PRN
Status: DISCONTINUED | OUTPATIENT
Start: 2019-02-18 | End: 2019-02-20 | Stop reason: HOSPADM

## 2019-02-18 RX ORDER — IBUPROFEN 600 MG/1
600 TABLET ORAL ONCE AS NEEDED
Status: COMPLETED | OUTPATIENT
Start: 2019-02-18 | End: 2019-02-19

## 2019-02-18 RX ORDER — METOCLOPRAMIDE HYDROCHLORIDE 5 MG/ML
10 INJECTION INTRAMUSCULAR; INTRAVENOUS ONCE AS NEEDED
Status: DISCONTINUED | OUTPATIENT
Start: 2019-02-18 | End: 2019-02-20 | Stop reason: HOSPADM

## 2019-02-18 RX ORDER — ONDANSETRON 2 MG/ML
4 INJECTION INTRAMUSCULAR; INTRAVENOUS ONCE
Status: DISCONTINUED | OUTPATIENT
Start: 2019-02-18 | End: 2019-02-20 | Stop reason: HOSPADM

## 2019-02-18 RX ORDER — HYDROMORPHONE HYDROCHLORIDE 1 MG/ML
0.5 INJECTION, SOLUTION INTRAMUSCULAR; INTRAVENOUS; SUBCUTANEOUS
Status: ACTIVE | OUTPATIENT
Start: 2019-02-18 | End: 2019-02-19

## 2019-02-18 RX ORDER — ACETAMINOPHEN 325 MG/1
650 TABLET ORAL ONCE AS NEEDED
Status: DISCONTINUED | OUTPATIENT
Start: 2019-02-18 | End: 2019-02-20 | Stop reason: HOSPADM

## 2019-02-18 RX ADMIN — DIPHENHYDRAMINE HYDROCHLORIDE 25 MG: 25 CAPSULE ORAL at 16:33

## 2019-02-18 RX ADMIN — IBUPROFEN 600 MG: 600 TABLET ORAL at 17:11

## 2019-02-18 RX ADMIN — IBUPROFEN 600 MG: 600 TABLET ORAL at 23:06

## 2019-02-18 RX ADMIN — OXYCODONE AND ACETAMINOPHEN 1 TABLET: 5; 325 TABLET ORAL at 21:00

## 2019-02-18 RX ADMIN — DOCUSATE SODIUM 100 MG: 100 CAPSULE, LIQUID FILLED ORAL at 21:00

## 2019-02-18 RX ADMIN — HYDROCORTISONE: 1 CREAM TOPICAL at 15:19

## 2019-02-18 RX ADMIN — OXYCODONE AND ACETAMINOPHEN 1 TABLET: 5; 325 TABLET ORAL at 01:36

## 2019-02-18 RX ADMIN — OXYCODONE AND ACETAMINOPHEN 1 TABLET: 5; 325 TABLET ORAL at 06:10

## 2019-02-18 RX ADMIN — DOCUSATE SODIUM 100 MG: 100 CAPSULE, LIQUID FILLED ORAL at 09:44

## 2019-02-18 RX ADMIN — IBUPROFEN 600 MG: 600 TABLET ORAL at 09:44

## 2019-02-18 RX ADMIN — OXYCODONE AND ACETAMINOPHEN 1 TABLET: 5; 325 TABLET ORAL at 17:11

## 2019-02-18 RX ADMIN — OXYCODONE AND ACETAMINOPHEN 1 TABLET: 5; 325 TABLET ORAL at 12:44

## 2019-02-18 RX ADMIN — DIPHENHYDRAMINE HYDROCHLORIDE 25 MG: 25 CAPSULE ORAL at 10:39

## 2019-02-18 RX ADMIN — IBUPROFEN 600 MG: 600 TABLET ORAL at 01:36

## 2019-02-18 NOTE — ANESTHESIA POSTPROCEDURE EVALUATION
Patient: Luz Elena Chakraborty    Procedure Summary     Date:  19 Room / Location:  [facility] DELIVERY   [facility]    Anesthesia Start:   Anesthesia Stop:      Procedure:   SECTION REPEAT (N/A Abdomen) Diagnosis:      Surgeon:  Leeroy Hagan MD Provider:      Anesthesia Type:  spinal ASA Status:  2 - Emergent          Anesthesia Type: spinal  Last vitals  BP 97/46     Temp 97.6     Pulse 87     Resp 16      SpO2 96        Post Anesthesia Care and Evaluation    Patient location during evaluation: bedside  Patient participation: complete - patient participated  Level of consciousness: awake and alert  Pain score: 0  Pain management: adequate  Airway patency: patent  Anesthetic complications: No anesthetic complications    Cardiovascular status: acceptable  Respiratory status: acceptable  Hydration status: acceptable

## 2019-02-18 NOTE — ANESTHESIA POSTPROCEDURE EVALUATION
Patient: Luz Elena Chakraborty    Procedure Summary     Date:  19 Room / Location:  [facility] LABOR DELIVERY   [facility]IV[facility]    Anesthesia Start:   Anesthesia Stop:      Procedure:   SECTION REPEAT (N/A Abdomen) Diagnosis:      Surgeon:  Leeroy Hagan MD Provider:  Jose Chuahan MD    Anesthesia Type:  spinal ASA Status:  2 - Emergent          Anesthesia Type: spinal  Last vitals  BP   121/59 (19 0800)   Temp   98 °F (36.7 °C) (19 0800)   Pulse   80 (19 0800)   Resp   16 (19 0800)     SpO2   99 % (19)     Post Anesthesia Care and Evaluation    Patient location during evaluation: bedside  Patient participation: complete - patient participated  Level of consciousness: awake and alert  Pain management: adequate  Airway patency: patent  Anesthetic complications: No anesthetic complications    Cardiovascular status: acceptable  Respiratory status: acceptable  Hydration status: acceptable  Post Neuraxial Block status: Motor and sensory function returned to baseline and No signs or symptoms of PDPH

## 2019-02-19 LAB
BASOPHILS # BLD AUTO: 0.02 10*3/MM3 (ref 0–0.2)
BASOPHILS NFR BLD AUTO: 0.2 % (ref 0–1)
CYTO UR: NORMAL
DEPRECATED RDW RBC AUTO: 50.5 FL (ref 37–54)
EOSINOPHIL # BLD AUTO: 0.25 10*3/MM3 (ref 0–0.3)
EOSINOPHIL NFR BLD AUTO: 2.6 % (ref 0–3)
ERYTHROCYTE [DISTWIDTH] IN BLOOD BY AUTOMATED COUNT: 13.7 % (ref 11.3–14.5)
HCT VFR BLD AUTO: 32.5 % (ref 34.5–44)
HGB BLD-MCNC: 10.5 G/DL (ref 11.5–15.5)
IMM GRANULOCYTES # BLD AUTO: 0.05 10*3/MM3 (ref 0–0.05)
IMM GRANULOCYTES NFR BLD AUTO: 0.5 % (ref 0–0.6)
LAB AP CASE REPORT: NORMAL
LAB AP CLINICAL INFORMATION: NORMAL
LYMPHOCYTES # BLD AUTO: 0.93 10*3/MM3 (ref 0.6–4.8)
LYMPHOCYTES NFR BLD AUTO: 9.5 % (ref 24–44)
MCH RBC QN AUTO: 32.8 PG (ref 27–31)
MCHC RBC AUTO-ENTMCNC: 32.3 G/DL (ref 32–36)
MCV RBC AUTO: 101.6 FL (ref 80–99)
MONOCYTES # BLD AUTO: 0.95 10*3/MM3 (ref 0–1)
MONOCYTES NFR BLD AUTO: 9.7 % (ref 0–12)
NEUTROPHILS # BLD AUTO: 7.62 10*3/MM3 (ref 1.5–8.3)
NEUTROPHILS NFR BLD AUTO: 78 % (ref 41–71)
PATH REPORT.FINAL DX SPEC: NORMAL
PATH REPORT.GROSS SPEC: NORMAL
PLATELET # BLD AUTO: 171 10*3/MM3 (ref 150–450)
PMV BLD AUTO: 9.6 FL (ref 6–12)
RBC # BLD AUTO: 3.2 10*6/MM3 (ref 3.89–5.14)
WBC NRBC COR # BLD: 9.77 10*3/MM3 (ref 3.5–10.8)

## 2019-02-19 PROCEDURE — 85025 COMPLETE CBC W/AUTO DIFF WBC: CPT | Performed by: OBSTETRICS & GYNECOLOGY

## 2019-02-19 RX ADMIN — OXYCODONE AND ACETAMINOPHEN 1 TABLET: 5; 325 TABLET ORAL at 13:53

## 2019-02-19 RX ADMIN — IBUPROFEN 600 MG: 600 TABLET ORAL at 10:46

## 2019-02-19 RX ADMIN — OXYCODONE AND ACETAMINOPHEN 1 TABLET: 5; 325 TABLET ORAL at 17:49

## 2019-02-19 RX ADMIN — IBUPROFEN 600 MG: 600 TABLET ORAL at 17:50

## 2019-02-19 RX ADMIN — OXYCODONE AND ACETAMINOPHEN 1 TABLET: 5; 325 TABLET ORAL at 22:27

## 2019-02-19 RX ADMIN — DOCUSATE SODIUM 100 MG: 100 CAPSULE, LIQUID FILLED ORAL at 09:12

## 2019-02-19 RX ADMIN — HYDROCORTISONE: 1 CREAM TOPICAL at 09:13

## 2019-02-19 RX ADMIN — IBUPROFEN 600 MG: 600 TABLET, FILM COATED ORAL at 04:15

## 2019-02-19 RX ADMIN — OXYCODONE AND ACETAMINOPHEN 1 TABLET: 5; 325 TABLET ORAL at 04:15

## 2019-02-19 RX ADMIN — OXYCODONE AND ACETAMINOPHEN 1 TABLET: 5; 325 TABLET ORAL at 09:12

## 2019-02-20 ENCOUNTER — APPOINTMENT (OUTPATIENT)
Dept: PREADMISSION TESTING | Facility: HOSPITAL | Age: 29
End: 2019-02-20

## 2019-02-20 VITALS
BODY MASS INDEX: 24.99 KG/M2 | DIASTOLIC BLOOD PRESSURE: 81 MMHG | SYSTOLIC BLOOD PRESSURE: 130 MMHG | WEIGHT: 150 LBS | HEIGHT: 65 IN | OXYGEN SATURATION: 99 % | HEART RATE: 91 BPM | RESPIRATION RATE: 18 BRPM | TEMPERATURE: 97.6 F

## 2019-02-20 PROBLEM — Z34.90 PREGNANCY: Status: RESOLVED | Noted: 2019-02-17 | Resolved: 2019-02-20

## 2019-02-20 RX ORDER — OXYCODONE HYDROCHLORIDE AND ACETAMINOPHEN 5; 325 MG/1; MG/1
1 TABLET ORAL EVERY 4 HOURS PRN
Qty: 35 TABLET | Refills: 0 | Status: SHIPPED | OUTPATIENT
Start: 2019-02-20 | End: 2019-02-27

## 2019-02-20 RX ORDER — IBUPROFEN 600 MG/1
600 TABLET ORAL EVERY 6 HOURS PRN
Qty: 35 TABLET | Refills: 0 | Status: SHIPPED | OUTPATIENT
Start: 2019-02-20

## 2019-02-20 RX ADMIN — DOCUSATE SODIUM 100 MG: 100 CAPSULE, LIQUID FILLED ORAL at 08:53

## 2019-02-20 RX ADMIN — IBUPROFEN 600 MG: 600 TABLET ORAL at 02:45

## 2019-02-20 RX ADMIN — IBUPROFEN 600 MG: 600 TABLET ORAL at 08:53

## (undated) DEVICE — TRY SPINE BLCK WHITACRE 25G 3X5IN

## (undated) DEVICE — SUT GUT CHRM 1 CTX 36IN 905H

## (undated) DEVICE — MAT PREVALON MOBL TRANSFR AIR WO/PAD 39X80IN

## (undated) DEVICE — GLV SURG BIOGEL LTX PF 6 1/2

## (undated) DEVICE — SKIN AFFIX SURG ADHESIVE 72/CS 0.55ML: Brand: MEDLINE

## (undated) DEVICE — SOL IRR NACL 0.9PCT BT 1000ML

## (undated) DEVICE — SOL IRR H2O BTL 1000ML STRL

## (undated) DEVICE — PK C/SECT 10

## (undated) DEVICE — SUT GUT CHRM 2/0 CT1 27IN 811H

## (undated) DEVICE — SUT PDS 1 TP1 48IN Z880G BX/12

## (undated) DEVICE — SUT VIC 4/0 KS 27IN VCP662H